# Patient Record
Sex: FEMALE | Race: WHITE | NOT HISPANIC OR LATINO | Employment: OTHER | URBAN - METROPOLITAN AREA
[De-identification: names, ages, dates, MRNs, and addresses within clinical notes are randomized per-mention and may not be internally consistent; named-entity substitution may affect disease eponyms.]

---

## 2017-01-03 ENCOUNTER — TELEPHONE (OUTPATIENT)
Dept: FAMILY MEDICINE CLINIC | Facility: CLINIC | Age: 42
End: 2017-01-03

## 2017-01-03 DIAGNOSIS — F98.8 ATTENTION DEFICIT DISORDER OF ADULT: ICD-10-CM

## 2017-01-03 NOTE — TELEPHONE ENCOUNTER
----- Message from Cathy Escobedo sent at 1/3/2017  8:11 AM EST -----  Contact: KATE  PATIENT REQUESTING A REFILL:    VYVANSE 40 MG    [phone]

## 2017-01-03 NOTE — TELEPHONE ENCOUNTER
----- Message from Cathy Escobedo sent at 1/3/2017  8:09 AM EST -----  Contact: LOVE  PATIENT REQUESTING A REFILL:    VYVANSE 40 MG.    [phone], MESSAGE OK

## 2017-02-03 ENCOUNTER — OFFICE VISIT (OUTPATIENT)
Dept: FAMILY MEDICINE CLINIC | Facility: CLINIC | Age: 42
End: 2017-02-03

## 2017-02-03 VITALS
HEART RATE: 77 BPM | HEIGHT: 70 IN | DIASTOLIC BLOOD PRESSURE: 72 MMHG | WEIGHT: 173 LBS | BODY MASS INDEX: 24.77 KG/M2 | SYSTOLIC BLOOD PRESSURE: 112 MMHG | OXYGEN SATURATION: 99 % | TEMPERATURE: 98.1 F | RESPIRATION RATE: 16 BRPM

## 2017-02-03 DIAGNOSIS — F98.8 ATTENTION DEFICIT DISORDER OF ADULT: Primary | ICD-10-CM

## 2017-02-03 PROBLEM — H91.93 BILATERAL HEARING LOSS: Status: ACTIVE | Noted: 2017-02-03

## 2017-02-03 PROCEDURE — 99213 OFFICE O/P EST LOW 20 MIN: CPT | Performed by: FAMILY MEDICINE

## 2017-02-03 NOTE — PROGRESS NOTES
Chief Complaint   Patient presents with   • ADD   • Med Refill       Subjective     HPI    ADD:    Pooja Whitehead is here for follow up for ADD.     Side effects: none    Medication: Vyvanse 40 mg daily  The patient reports adherence to this regimen    Appetite: unchanged    Sleep: sleeping well    Other Concerns: Some improvement but still decreased focus.   Has some h/o anxiety and has not noticed increased anxiety  trying to losing weight. Very low carb diet x 1 month.     On Wellbutrin as well       Past Medical History,Medications, Allergies, and social history was reviewed.    Review of Systems   Constitutional: Negative.    HENT: Positive for hearing loss (has hearing aids).    Respiratory: Negative.    Gastrointestinal: Negative.    Neurological: Negative.    Psychiatric/Behavioral: Negative.        Objective     Physical Exam   Constitutional: She is oriented to person, place, and time. She appears well-developed and well-nourished.   HENT:   Head: Normocephalic and atraumatic.   Right Ear: Tympanic membrane, external ear and ear canal normal. No decreased hearing is noted.   Left Ear: Tympanic membrane, external ear and ear canal normal. No decreased hearing is noted.   Eyes: Conjunctivae and EOM are normal. Pupils are equal, round, and reactive to light.   Cardiovascular: Normal rate, regular rhythm and normal heart sounds.    No murmur heard.  Pulmonary/Chest: Effort normal and breath sounds normal. No respiratory distress. She has no wheezes. She has no rales.   Neurological: She is alert and oriented to person, place, and time.   Skin: Skin is warm.   Psychiatric: She has a normal mood and affect. Her behavior is normal.   Nursing note and vitals reviewed.        Assessment/Plan     Problem List Items Addressed This Visit        Other    Attention deficit disorder of adult - [facility] dated on 2/3/2017   was reviewed and appropriate.      DISCUSSION  Increase to Vyvanse 50 mg one daily.    Follow-up office visit in 3 months.   Call if any side effects or concerns.           MEDICATIONS PRESCRIBED  Requested Prescriptions      No prescriptions requested or ordered in this encounter          Donald Will MD

## 2017-03-09 ENCOUNTER — TELEPHONE (OUTPATIENT)
Dept: FAMILY MEDICINE CLINIC | Facility: CLINIC | Age: 42
End: 2017-03-09

## 2017-03-09 DIAGNOSIS — F98.8 ATTENTION DEFICIT DISORDER OF ADULT: ICD-10-CM

## 2017-03-09 NOTE — TELEPHONE ENCOUNTER
----- Message from Cathy Escobedo sent at 3/9/2017  8:51 AM EST -----  Contact: LOVE  PATIENT REQUESTING A REFILL:      VYVANSE 50 MG      [phone], MESSAGE OK

## 2017-04-14 ENCOUNTER — TELEPHONE (OUTPATIENT)
Dept: FAMILY MEDICINE CLINIC | Facility: CLINIC | Age: 42
End: 2017-04-14

## 2017-04-14 DIAGNOSIS — F98.8 ATTENTION DEFICIT DISORDER OF ADULT: ICD-10-CM

## 2017-04-14 NOTE — TELEPHONE ENCOUNTER
----- Message from Cathy Escobedo sent at 4/14/2017  8:57 AM EDT -----  Contact: LOVE  PATIENT REQUESTING A REFILL:    VYVANSE 50 MG    [phone]

## 2017-05-05 ENCOUNTER — OFFICE VISIT (OUTPATIENT)
Dept: FAMILY MEDICINE CLINIC | Facility: CLINIC | Age: 42
End: 2017-05-05

## 2017-05-05 VITALS
WEIGHT: 170.6 LBS | TEMPERATURE: 97.6 F | HEIGHT: 70 IN | DIASTOLIC BLOOD PRESSURE: 70 MMHG | HEART RATE: 76 BPM | SYSTOLIC BLOOD PRESSURE: 110 MMHG | RESPIRATION RATE: 16 BRPM | BODY MASS INDEX: 24.42 KG/M2

## 2017-05-05 DIAGNOSIS — F41.8 MIXED ANXIETY DEPRESSIVE DISORDER: ICD-10-CM

## 2017-05-05 DIAGNOSIS — F98.8 ATTENTION DEFICIT DISORDER OF ADULT: ICD-10-CM

## 2017-05-05 DIAGNOSIS — F98.8 ATTENTION DEFICIT DISORDER OF ADULT: Primary | ICD-10-CM

## 2017-05-05 DIAGNOSIS — G25.81 RESTLESS LEG SYNDROME: ICD-10-CM

## 2017-05-05 DIAGNOSIS — G43.009 MIGRAINE WITHOUT AURA AND WITHOUT STATUS MIGRAINOSUS, NOT INTRACTABLE: ICD-10-CM

## 2017-05-05 PROCEDURE — 99214 OFFICE O/P EST MOD 30 MIN: CPT | Performed by: NURSE PRACTITIONER

## 2017-05-05 RX ORDER — ROPINIROLE 1 MG/1
1 TABLET, FILM COATED ORAL NIGHTLY PRN
COMMUNITY
End: 2017-12-27

## 2017-05-05 RX ORDER — BUPROPION HYDROCHLORIDE 300 MG/1
300 TABLET ORAL DAILY
Qty: 90 TABLET | Refills: 3 | Status: SHIPPED | OUTPATIENT
Start: 2017-05-05 | End: 2018-01-16 | Stop reason: SDUPTHER

## 2017-05-19 ENCOUNTER — TELEPHONE (OUTPATIENT)
Dept: FAMILY MEDICINE CLINIC | Facility: CLINIC | Age: 42
End: 2017-05-19

## 2017-05-19 RX ORDER — ESCITALOPRAM OXALATE 10 MG/1
10 TABLET ORAL DAILY
Qty: 30 TABLET | Refills: 3 | Status: SHIPPED | OUTPATIENT
Start: 2017-05-19 | End: 2017-06-08 | Stop reason: ALTCHOICE

## 2017-06-08 ENCOUNTER — TELEPHONE (OUTPATIENT)
Dept: FAMILY MEDICINE CLINIC | Facility: CLINIC | Age: 42
End: 2017-06-08

## 2017-06-08 RX ORDER — ESCITALOPRAM OXALATE 5 MG
5 TABLET ORAL DAILY
Qty: 30 TABLET | Refills: 5 | Status: SHIPPED | OUTPATIENT
Start: 2017-06-08 | End: 2017-08-04 | Stop reason: DRUGHIGH

## 2017-06-08 NOTE — TELEPHONE ENCOUNTER
Pt reports that she is having some urinary retention since starting Lexapro 5 mg. She never had problems with this medication in the past, but she reports that she was on branded drug before when she took it. She has had similar sx in the past with other medications (Duloxetine, Celexa, Strattera). She is doing much better with depression since starting this and was wondering if could change Lexapro to branded drug. Will send in script for brand only Lexapro and see if sx resolve. If not will need to discontinue med. She will make follow up appt if continues to have issues. ajc

## 2017-06-13 ENCOUNTER — TELEPHONE (OUTPATIENT)
Dept: FAMILY MEDICINE CLINIC | Facility: CLINIC | Age: 42
End: 2017-06-13

## 2017-06-13 DIAGNOSIS — F98.8 ATTENTION DEFICIT DISORDER OF ADULT: ICD-10-CM

## 2017-06-13 NOTE — TELEPHONE ENCOUNTER
----- Message from Maricruz Mayes sent at 6/13/2017  9:39 AM EDT -----  Contact: [facility]  Patient is needing a refill on Vyvanse 50mg. Please call patient when ready [phone], OK to leave a message.

## 2017-07-13 ENCOUNTER — TELEPHONE (OUTPATIENT)
Dept: FAMILY MEDICINE CLINIC | Facility: CLINIC | Age: 42
End: 2017-07-13

## 2017-07-13 DIAGNOSIS — F98.8 ATTENTION DEFICIT DISORDER OF ADULT: ICD-10-CM

## 2017-07-13 NOTE — TELEPHONE ENCOUNTER
----- Message from Idania Elizabeth sent at 7/13/2017 10:31 AM EDT -----  Contact: KATE/LISET CAIN  PATIENT IS NEEDING A REFILL ON VYVANCE 50 MG 1 X A DAY PATIENT WAS ADVISED OF THE  48 HOUR TURNAROUND TIME PLEASE CALL HER  [phone] OK T O LEAVE MSG

## 2017-08-04 ENCOUNTER — OFFICE VISIT (OUTPATIENT)
Dept: FAMILY MEDICINE CLINIC | Facility: CLINIC | Age: 42
End: 2017-08-04

## 2017-08-04 VITALS
HEIGHT: 70 IN | WEIGHT: 166 LBS | BODY MASS INDEX: 23.77 KG/M2 | RESPIRATION RATE: 16 BRPM | HEART RATE: 72 BPM | DIASTOLIC BLOOD PRESSURE: 75 MMHG | SYSTOLIC BLOOD PRESSURE: 130 MMHG

## 2017-08-04 DIAGNOSIS — L65.9 THINNING HAIR: ICD-10-CM

## 2017-08-04 DIAGNOSIS — F98.8 ATTENTION DEFICIT DISORDER OF ADULT: Primary | ICD-10-CM

## 2017-08-04 DIAGNOSIS — F41.8 MIXED ANXIETY DEPRESSIVE DISORDER: ICD-10-CM

## 2017-08-04 PROCEDURE — 99214 OFFICE O/P EST MOD 30 MIN: CPT | Performed by: NURSE PRACTITIONER

## 2017-08-04 RX ORDER — ESCITALOPRAM OXALATE 10 MG/1
10 TABLET, FILM COATED ORAL DAILY
Qty: 30 TABLET | Refills: 5 | Status: SHIPPED | OUTPATIENT
Start: 2017-08-04 | End: 2018-01-16 | Stop reason: SDUPTHER

## 2017-08-04 NOTE — PROGRESS NOTES
Subjective   Pooja Whitehead is a 41 y.o. female.     History of Present Illness   ADHD follow up, needs refill of Vyvanse  Doing well, no nervousness, difficulty sleeping, normal appetite    Anxiety/depression  Doing better on Lexapro with Bupropion but would like a little bump in dose thinks it will help  Paying cash for branded drug  No SI/HI, sleeping enough not too much   having struggles still with prosthetic leg, mood  Financially doing okay now that he has returned to work  She would like to have  testing to see what medication work best for her she has tried and failed numerous SSRI, SSNI medications for depression/anxiety and ADHD, very sensitive to pain medication.    New problem   Hair thinning for the past 3 months, used to have very thick hair. Has been taking Biotin and vitamins for skin, hair and nails but not really helping  Thinks it could be stress or hormone related  Had labs drawn and all results came back normal  She is perimenopausal, still has a regular menstrual cycle  Started using some topical OTC medication for hair loss Rogaine for the past several weeks    The following portions of the patient's history were reviewed and updated as appropriate: allergies, current medications, past family history, past medical history, past social history, past surgical history and problem list.    Review of Systems   Constitutional: Negative.  Negative for appetite change.   HENT: Negative.    Eyes: Negative.    Respiratory: Negative.  Negative for cough, choking and shortness of breath.    Cardiovascular: Negative.  Negative for chest pain, palpitations and leg swelling.   Gastrointestinal: Negative.    Endocrine: Negative.    Genitourinary: Negative.  Negative for difficulty urinating.   Musculoskeletal: Negative.    Skin: Negative.    Allergic/Immunologic: Negative.    Neurological: Negative.  Negative for dizziness and light-headedness.   Hematological: Negative.    Psychiatric/Behavioral:  Positive for dysphoric mood. Negative for self-injury, sleep disturbance and suicidal ideas. The patient is nervous/anxious.        Objective   Physical Exam   Constitutional: She is oriented to person, place, and time. She appears well-developed and well-nourished.   HENT:   Head: Normocephalic.   Right Ear: External ear normal.   Left Ear: External ear normal.   Nose: Nose normal.   Eyes: Lids are normal.   Neck: Trachea normal. Neck supple.   Cardiovascular: Normal rate, regular rhythm, S1 normal, S2 normal and normal heart sounds.    Pulmonary/Chest: Effort normal and breath sounds normal.   Musculoskeletal: Normal range of motion.   Neurological: She is alert and oriented to person, place, and time.   Skin: Skin is warm, dry and intact. No rash noted. No cyanosis. Nails show no clubbing.   Visible thinning of hair in front and top of scalp   Psychiatric: She has a normal mood and affect. Her speech is normal and behavior is normal. Judgment and thought content normal. Cognition and memory are normal.   Nursing note and vitals reviewed.      Assessment/Plan   Pooja was seen today for follow-up, adhd, anxiety and hairloss.    Diagnoses and all orders for this visit:    Attention deficit disorder of adult  -     lisdexamfetamine (VYVANSE) 50 MG capsule; Take 1 capsule by mouth Every Morning.    Thinning hair    Mixed anxiety depressive disorder    Other orders  -     LEXAPRO 10 MG tablet; Take 1 tablet by mouth Daily.    will increase Lexapro to 10 mg  Will send genomic testing and will notify pt of results  Will have Dr Fontana refill Vyvanse.   Labs reviewed for hairloss and thyroid, hormone levels all normal. Will scan results into chart. Continue medication OTC for hair regrowth  Pt to follow up in 3 months.

## 2017-09-06 ENCOUNTER — TRANSCRIBE ORDERS (OUTPATIENT)
Dept: ADMINISTRATIVE | Facility: HOSPITAL | Age: 42
End: 2017-09-06

## 2017-09-06 DIAGNOSIS — Z12.31 VISIT FOR SCREENING MAMMOGRAM: Primary | ICD-10-CM

## 2017-09-07 ENCOUNTER — TELEPHONE (OUTPATIENT)
Dept: FAMILY MEDICINE CLINIC | Facility: CLINIC | Age: 42
End: 2017-09-07

## 2017-09-07 DIAGNOSIS — F98.8 ATTENTION DEFICIT DISORDER OF ADULT: ICD-10-CM

## 2017-10-05 ENCOUNTER — TELEPHONE (OUTPATIENT)
Dept: FAMILY MEDICINE CLINIC | Facility: CLINIC | Age: 42
End: 2017-10-05

## 2017-10-05 DIAGNOSIS — F98.8 ATTENTION DEFICIT DISORDER OF ADULT: ICD-10-CM

## 2017-10-05 NOTE — TELEPHONE ENCOUNTER
----- Message from Cathy Escobedo sent at 10/5/2017  8:11 AM EDT -----  Contact: LOVE  PATIENT WILL BE GOING OUT OF TOWN ON SAT. MORNING AND COMES BACK ON THE OCT. 16TH. BUT, HER VYVANSE RX IS DUE ON THE 16TH. ASKING IF SHE CAN PICK ONE UP POST DATED FOR THE 16TH.     VYVANSE 50 MG    [phone], LEAVE MESSAGE

## 2017-10-05 NOTE — TELEPHONE ENCOUNTER
Left detailed message for pt to  Rx and bring photo ID. Provided office number for pt to cb with any questions.

## 2017-10-05 NOTE — TELEPHONE ENCOUNTER
Please call.  Okay to  the prescription.  Since the  for this will be less than 2 months from date written, the date on the prescription will not be an issue.  It will show the date as today but okay to  on the 16th.

## 2017-10-18 ENCOUNTER — HOSPITAL ENCOUNTER (OUTPATIENT)
Dept: MAMMOGRAPHY | Facility: HOSPITAL | Age: 42
Discharge: HOME | End: 2017-10-18
Attending: OBSTETRICS & GYNECOLOGY | Admitting: OBSTETRICS & GYNECOLOGY

## 2017-10-18 DIAGNOSIS — Z12.31 VISIT FOR SCREENING MAMMOGRAM: ICD-10-CM

## 2017-10-18 PROCEDURE — 77063 BREAST TOMOSYNTHESIS BI: CPT

## 2017-10-18 PROCEDURE — G0202 SCR MAMMO BI INCL CAD: HCPCS

## 2017-10-19 PROCEDURE — 77063 BREAST TOMOSYNTHESIS BI: CPT | Performed by: RADIOLOGY

## 2017-10-19 PROCEDURE — 77067 SCR MAMMO BI INCL CAD: CPT | Performed by: RADIOLOGY

## 2017-11-16 ENCOUNTER — TELEPHONE (OUTPATIENT)
Dept: FAMILY MEDICINE CLINIC | Facility: CLINIC | Age: 42
End: 2017-11-16

## 2017-11-16 DIAGNOSIS — F98.8 ATTENTION DEFICIT DISORDER OF ADULT: ICD-10-CM

## 2017-11-16 NOTE — TELEPHONE ENCOUNTER
----- Message from Nicole Babin sent at 11/16/2017 12:13 PM EST -----  Contact: DR LOVE MED REFILL  MED REFILL ON VYVANSE 50MG  [phone]

## 2017-12-19 ENCOUNTER — TELEPHONE (OUTPATIENT)
Dept: FAMILY MEDICINE CLINIC | Facility: CLINIC | Age: 42
End: 2017-12-19

## 2017-12-19 DIAGNOSIS — F98.8 ATTENTION DEFICIT DISORDER OF ADULT: ICD-10-CM

## 2017-12-19 DIAGNOSIS — G43.009 MIGRAINE WITHOUT AURA AND WITHOUT STATUS MIGRAINOSUS, NOT INTRACTABLE: ICD-10-CM

## 2017-12-19 RX ORDER — SUMATRIPTAN 100 MG/1
100 TABLET, FILM COATED ORAL
Qty: 9 TABLET | Refills: 5 | Status: SHIPPED | OUTPATIENT
Start: 2017-12-19 | End: 2022-08-19 | Stop reason: SDUPTHER

## 2017-12-19 NOTE — TELEPHONE ENCOUNTER
Ok to  Rx. Bds  Due for office visit for next refill and I sent in the imitrex to the pharmacy.

## 2017-12-19 NOTE — TELEPHONE ENCOUNTER
----- Message from Cathy Robins sent at 12/19/2017 11:02 AM EST -----  Contact: KATE;PT CALLED  REFILL ON VYANSE 50MG AND SUMAtriptan (IMITREX) 100 MG tablet    PHARMACY [facility] [place]    [phone]  MESSAGE OK

## 2017-12-27 RX ORDER — ASPIRIN 81 MG/1
81 TABLET ORAL DAILY
COMMUNITY
End: 2022-08-19

## 2017-12-28 ENCOUNTER — HOSPITAL ENCOUNTER (OUTPATIENT)
Facility: HOSPITAL | Age: 42
Setting detail: HOSPITAL OUTPATIENT SURGERY
Discharge: HOME | End: 2017-12-28
Attending: OBSTETRICS & GYNECOLOGY | Admitting: OBSTETRICS & GYNECOLOGY

## 2017-12-28 ENCOUNTER — ANESTHESIA EVENT (OUTPATIENT)
Dept: PERIOP | Facility: HOSPITAL | Age: 42
End: 2017-12-28

## 2017-12-28 ENCOUNTER — ANESTHESIA (OUTPATIENT)
Dept: PERIOP | Facility: HOSPITAL | Age: 42
End: 2017-12-28

## 2017-12-28 VITALS
SYSTOLIC BLOOD PRESSURE: 120 MMHG | OXYGEN SATURATION: 94 % | RESPIRATION RATE: 14 BRPM | TEMPERATURE: 98.1 F | BODY MASS INDEX: 24.05 KG/M2 | HEIGHT: 70 IN | HEART RATE: 69 BPM | WEIGHT: 168 LBS | DIASTOLIC BLOOD PRESSURE: 78 MMHG

## 2017-12-28 DIAGNOSIS — N92.0 MENORRHAGIA: ICD-10-CM

## 2017-12-28 LAB
B-HCG UR QL: NEGATIVE
INTERNAL NEGATIVE CONTROL: NORMAL
INTERNAL POSITIVE CONTROL: REACTIVE
Lab: NORMAL

## 2017-12-28 PROCEDURE — 25010000002 KETOROLAC TROMETHAMINE PER 15 MG: Performed by: NURSE ANESTHETIST, CERTIFIED REGISTERED

## 2017-12-28 PROCEDURE — 88305 TISSUE EXAM BY PATHOLOGIST: CPT | Performed by: OBSTETRICS & GYNECOLOGY

## 2017-12-28 PROCEDURE — 25010000002 FENTANYL CITRATE (PF) 100 MCG/2ML SOLUTION: Performed by: NURSE ANESTHETIST, CERTIFIED REGISTERED

## 2017-12-28 PROCEDURE — 25010000002 DEXAMETHASONE PER 1 MG: Performed by: NURSE ANESTHETIST, CERTIFIED REGISTERED

## 2017-12-28 PROCEDURE — 25010000002 FENTANYL CITRATE (PF) 100 MCG/2ML SOLUTION: Performed by: ANESTHESIOLOGY

## 2017-12-28 PROCEDURE — 25010000002 MIDAZOLAM PER 1 MG: Performed by: NURSE ANESTHETIST, CERTIFIED REGISTERED

## 2017-12-28 PROCEDURE — 25010000002 PROPOFOL 10 MG/ML EMULSION: Performed by: NURSE ANESTHETIST, CERTIFIED REGISTERED

## 2017-12-28 PROCEDURE — 25010000002 ONDANSETRON PER 1 MG: Performed by: NURSE ANESTHETIST, CERTIFIED REGISTERED

## 2017-12-28 RX ORDER — ACETAMINOPHEN 160 MG/5ML
650 SOLUTION ORAL EVERY 4 HOURS PRN
Status: CANCELLED | OUTPATIENT
Start: 2017-12-28

## 2017-12-28 RX ORDER — HYDROCODONE BITARTRATE AND ACETAMINOPHEN 5; 325 MG/1; MG/1
1 TABLET ORAL EVERY 6 HOURS PRN
Qty: 15 TABLET | Refills: 0 | Status: SHIPPED | OUTPATIENT
Start: 2017-12-28 | End: 2018-01-07

## 2017-12-28 RX ORDER — SODIUM CHLORIDE, SODIUM LACTATE, POTASSIUM CHLORIDE, CALCIUM CHLORIDE 600; 310; 30; 20 MG/100ML; MG/100ML; MG/100ML; MG/100ML
9 INJECTION, SOLUTION INTRAVENOUS CONTINUOUS
Status: DISCONTINUED | OUTPATIENT
Start: 2017-12-28 | End: 2017-12-28 | Stop reason: HOSPADM

## 2017-12-28 RX ORDER — HYDROMORPHONE HYDROCHLORIDE 1 MG/ML
0.5 INJECTION, SOLUTION INTRAMUSCULAR; INTRAVENOUS; SUBCUTANEOUS
Status: DISCONTINUED | OUTPATIENT
Start: 2017-12-28 | End: 2017-12-28 | Stop reason: HOSPADM

## 2017-12-28 RX ORDER — ACETAMINOPHEN 325 MG/1
650 TABLET ORAL EVERY 4 HOURS PRN
Status: CANCELLED | OUTPATIENT
Start: 2017-12-28

## 2017-12-28 RX ORDER — IBUPROFEN 800 MG/1
800 TABLET ORAL EVERY 6 HOURS PRN
Qty: 30 TABLET | Refills: 1 | Status: SHIPPED | OUTPATIENT
Start: 2017-12-28 | End: 2018-01-16

## 2017-12-28 RX ORDER — ONDANSETRON 2 MG/ML
4 INJECTION INTRAMUSCULAR; INTRAVENOUS EVERY 6 HOURS PRN
Status: CANCELLED | OUTPATIENT
Start: 2017-12-28

## 2017-12-28 RX ORDER — LIDOCAINE HYDROCHLORIDE 10 MG/ML
0.5 INJECTION, SOLUTION EPIDURAL; INFILTRATION; INTRACAUDAL; PERINEURAL ONCE AS NEEDED
Status: COMPLETED | OUTPATIENT
Start: 2017-12-28 | End: 2017-12-28

## 2017-12-28 RX ORDER — ONDANSETRON 4 MG/1
4 TABLET, FILM COATED ORAL EVERY 6 HOURS PRN
Status: CANCELLED | OUTPATIENT
Start: 2017-12-28

## 2017-12-28 RX ORDER — HYDROCODONE BITARTRATE AND ACETAMINOPHEN 5; 325 MG/1; MG/1
1 TABLET ORAL EVERY 4 HOURS PRN
Status: CANCELLED | OUTPATIENT
Start: 2017-12-28 | End: 2018-01-07

## 2017-12-28 RX ORDER — FENTANYL CITRATE 50 UG/ML
INJECTION, SOLUTION INTRAMUSCULAR; INTRAVENOUS AS NEEDED
Status: DISCONTINUED | OUTPATIENT
Start: 2017-12-28 | End: 2017-12-28 | Stop reason: SURG

## 2017-12-28 RX ORDER — ONDANSETRON 2 MG/ML
4 INJECTION INTRAMUSCULAR; INTRAVENOUS ONCE AS NEEDED
Status: DISCONTINUED | OUTPATIENT
Start: 2017-12-28 | End: 2017-12-28 | Stop reason: HOSPADM

## 2017-12-28 RX ORDER — HYDROCODONE BITARTRATE AND ACETAMINOPHEN 5; 325 MG/1; MG/1
1 TABLET ORAL ONCE AS NEEDED
Status: DISCONTINUED | OUTPATIENT
Start: 2017-12-28 | End: 2017-12-28 | Stop reason: HOSPADM

## 2017-12-28 RX ORDER — FAMOTIDINE 10 MG/ML
20 INJECTION, SOLUTION INTRAVENOUS ONCE
Status: DISCONTINUED | OUTPATIENT
Start: 2017-12-28 | End: 2017-12-28 | Stop reason: HOSPADM

## 2017-12-28 RX ORDER — PROMETHAZINE HYDROCHLORIDE 25 MG/1
25 SUPPOSITORY RECTAL ONCE AS NEEDED
Status: DISCONTINUED | OUTPATIENT
Start: 2017-12-28 | End: 2017-12-28 | Stop reason: HOSPADM

## 2017-12-28 RX ORDER — MIDAZOLAM HYDROCHLORIDE 1 MG/ML
INJECTION INTRAMUSCULAR; INTRAVENOUS AS NEEDED
Status: DISCONTINUED | OUTPATIENT
Start: 2017-12-28 | End: 2017-12-28 | Stop reason: SURG

## 2017-12-28 RX ORDER — KETOROLAC TROMETHAMINE 30 MG/ML
INJECTION, SOLUTION INTRAMUSCULAR; INTRAVENOUS AS NEEDED
Status: DISCONTINUED | OUTPATIENT
Start: 2017-12-28 | End: 2017-12-28 | Stop reason: SURG

## 2017-12-28 RX ORDER — FAMOTIDINE 20 MG/1
20 TABLET, FILM COATED ORAL ONCE
Status: COMPLETED | OUTPATIENT
Start: 2017-12-28 | End: 2017-12-28

## 2017-12-28 RX ORDER — DEXAMETHASONE SODIUM PHOSPHATE 4 MG/ML
INJECTION, SOLUTION INTRA-ARTICULAR; INTRALESIONAL; INTRAMUSCULAR; INTRAVENOUS; SOFT TISSUE AS NEEDED
Status: DISCONTINUED | OUTPATIENT
Start: 2017-12-28 | End: 2017-12-28 | Stop reason: SURG

## 2017-12-28 RX ORDER — PROMETHAZINE HYDROCHLORIDE 25 MG/ML
6.25 INJECTION, SOLUTION INTRAMUSCULAR; INTRAVENOUS ONCE AS NEEDED
Status: DISCONTINUED | OUTPATIENT
Start: 2017-12-28 | End: 2017-12-28 | Stop reason: HOSPADM

## 2017-12-28 RX ORDER — LIDOCAINE HYDROCHLORIDE 10 MG/ML
INJECTION, SOLUTION INFILTRATION; PERINEURAL AS NEEDED
Status: DISCONTINUED | OUTPATIENT
Start: 2017-12-28 | End: 2017-12-28 | Stop reason: SURG

## 2017-12-28 RX ORDER — FENTANYL CITRATE 50 UG/ML
50 INJECTION, SOLUTION INTRAMUSCULAR; INTRAVENOUS
Status: DISCONTINUED | OUTPATIENT
Start: 2017-12-28 | End: 2017-12-28 | Stop reason: HOSPADM

## 2017-12-28 RX ORDER — PROPOFOL 10 MG/ML
VIAL (ML) INTRAVENOUS AS NEEDED
Status: DISCONTINUED | OUTPATIENT
Start: 2017-12-28 | End: 2017-12-28 | Stop reason: SURG

## 2017-12-28 RX ORDER — IBUPROFEN 400 MG/1
400 TABLET ORAL EVERY 6 HOURS PRN
Status: CANCELLED | OUTPATIENT
Start: 2017-12-28

## 2017-12-28 RX ORDER — KETOROLAC TROMETHAMINE 30 MG/ML
30 INJECTION, SOLUTION INTRAMUSCULAR; INTRAVENOUS EVERY 6 HOURS PRN
Status: CANCELLED | OUTPATIENT
Start: 2017-12-28 | End: 2017-12-28

## 2017-12-28 RX ORDER — MAGNESIUM HYDROXIDE 1200 MG/15ML
LIQUID ORAL AS NEEDED
Status: DISCONTINUED | OUTPATIENT
Start: 2017-12-28 | End: 2017-12-28 | Stop reason: HOSPADM

## 2017-12-28 RX ORDER — PROMETHAZINE HYDROCHLORIDE 25 MG/1
25 TABLET ORAL ONCE AS NEEDED
Status: DISCONTINUED | OUTPATIENT
Start: 2017-12-28 | End: 2017-12-28 | Stop reason: HOSPADM

## 2017-12-28 RX ORDER — SODIUM CHLORIDE 0.9 % (FLUSH) 0.9 %
1-10 SYRINGE (ML) INJECTION AS NEEDED
Status: DISCONTINUED | OUTPATIENT
Start: 2017-12-28 | End: 2017-12-28 | Stop reason: HOSPADM

## 2017-12-28 RX ORDER — ONDANSETRON 2 MG/ML
INJECTION INTRAMUSCULAR; INTRAVENOUS AS NEEDED
Status: DISCONTINUED | OUTPATIENT
Start: 2017-12-28 | End: 2017-12-28 | Stop reason: SURG

## 2017-12-28 RX ADMIN — KETOROLAC TROMETHAMINE 30 MG: 30 INJECTION, SOLUTION INTRAMUSCULAR at 08:16

## 2017-12-28 RX ADMIN — LIDOCAINE HYDROCHLORIDE 0.5 ML: 10 INJECTION, SOLUTION EPIDURAL; INFILTRATION; INTRACAUDAL; PERINEURAL at 07:07

## 2017-12-28 RX ADMIN — ONDANSETRON 4 MG: 2 INJECTION INTRAMUSCULAR; INTRAVENOUS at 08:09

## 2017-12-28 RX ADMIN — PROPOFOL 200 MG: 10 INJECTION, EMULSION INTRAVENOUS at 07:59

## 2017-12-28 RX ADMIN — MIDAZOLAM HYDROCHLORIDE 2 MG: 1 INJECTION, SOLUTION INTRAMUSCULAR; INTRAVENOUS at 07:56

## 2017-12-28 RX ADMIN — FAMOTIDINE 20 MG: 20 TABLET ORAL at 07:07

## 2017-12-28 RX ADMIN — PROPOFOL 50 MG: 10 INJECTION, EMULSION INTRAVENOUS at 08:02

## 2017-12-28 RX ADMIN — FENTANYL CITRATE 50 MCG: 50 INJECTION, SOLUTION INTRAMUSCULAR; INTRAVENOUS at 08:02

## 2017-12-28 RX ADMIN — FENTANYL CITRATE 50 MCG: 50 INJECTION INTRAMUSCULAR; INTRAVENOUS at 08:50

## 2017-12-28 RX ADMIN — FENTANYL CITRATE 50 MCG: 50 INJECTION INTRAMUSCULAR; INTRAVENOUS at 08:45

## 2017-12-28 RX ADMIN — LIDOCAINE HYDROCHLORIDE 50 MG: 10 INJECTION, SOLUTION INFILTRATION; PERINEURAL at 07:59

## 2017-12-28 RX ADMIN — FENTANYL CITRATE 50 MCG: 50 INJECTION, SOLUTION INTRAMUSCULAR; INTRAVENOUS at 08:10

## 2017-12-28 RX ADMIN — DEXAMETHASONE SODIUM PHOSPHATE 8 MG: 4 INJECTION, SOLUTION INTRAMUSCULAR; INTRAVENOUS at 08:09

## 2017-12-28 RX ADMIN — SODIUM CHLORIDE, POTASSIUM CHLORIDE, SODIUM LACTATE AND CALCIUM CHLORIDE 9 ML/HR: 600; 310; 30; 20 INJECTION, SOLUTION INTRAVENOUS at 07:07

## 2017-12-28 NOTE — ANESTHESIA POSTPROCEDURE EVALUATION
Patient: Pooja Whitehead    Procedure Summary     Date Anesthesia Start Anesthesia Stop Room / Location    12/28/17 0756   [facility] / [facility]       Procedure Diagnosis Surgeon Provider    DILATATION AND CURETTAGE HYSTEROSCOPY NOVASURE ENDOMETRIAL ABLATION (N/A Vagina) No diagnosis on file. DO Kofi Hermosillo MD          Anesthesia Type: general  Last vitals  BP   130/76 (12/28/17 0703)   Temp   97.6 °F (36.4 °C) (12/28/17 0703)   Pulse   76 (12/28/17 0703)   Resp   18 (12/28/17 0703)     SpO2   96 % (12/28/17 0703)     Post Anesthesia Care and Evaluation    Patient location during evaluation: PACU  Patient participation: complete - patient participated  Post-procedure mental status: asleep.  Pain score: 0  Pain management: adequate  Airway patency: patent  Anesthetic complications: No anesthetic complications  PONV Status: none  Cardiovascular status: hemodynamically stable and acceptable  Respiratory status: nonlabored ventilation, acceptable and nasal cannula  Hydration status: acceptable

## 2017-12-28 NOTE — ANESTHESIA PREPROCEDURE EVALUATION
Anesthesia Evaluation     Patient summary reviewed and Nursing notes reviewed   NPO Solid Status: > 8 hours  NPO Liquid Status: > 8 hours     Airway   Mallampati: I  TM distance: <3 FB  Neck ROM: full  no difficulty expected  Dental - normal exam     Pulmonary - negative pulmonary ROS and normal exam   Cardiovascular - negative cardio ROS and normal exam        Neuro/Psych  (+) headaches, psychiatric history Anxiety and Depression,     GI/Hepatic/Renal/Endo - negative ROS     Musculoskeletal (-) negative ROS    Abdominal  - normal exam    Bowel sounds: normal.   Substance History - negative use     OB/GYN negative ob/gyn ROS         Other   (+) blood dyscrasia (FACTOR V LEIDEN)       Other Comment: HEARING LOSS                                      Anesthesia Plan    ASA 2     general     intravenous induction   Anesthetic plan and risks discussed with patient.    Plan discussed with CRNA.

## 2017-12-28 NOTE — ANESTHESIA PROCEDURE NOTES
Airway  Urgency: elective    Airway not difficult    General Information and Staff    Patient location during procedure: OR  CRNA: MAGALY GALINDO    Indications and Patient Condition  Indications for airway management: airway protection    Preoxygenated: yes  Mask difficulty assessment: 1 - vent by mask    Final Airway Details  Final airway type: supraglottic airway      Successful airway: classic  Size 4    Number of attempts at approach: 1    Additional Comments  LMA placed without difficulty, ventilation with assist, equal breath sounds and symmetric chest rise and fall

## 2018-01-03 LAB
CYTO UR: NORMAL
LAB AP CASE REPORT: NORMAL
LAB AP CLINICAL INFORMATION: NORMAL
LAB AP DIAGNOSIS COMMENT: NORMAL
Lab: NORMAL
PATH REPORT.FINAL DX SPEC: NORMAL
PATH REPORT.GROSS SPEC: NORMAL

## 2018-01-16 ENCOUNTER — OFFICE VISIT (OUTPATIENT)
Dept: FAMILY MEDICINE CLINIC | Facility: CLINIC | Age: 43
End: 2018-01-16

## 2018-01-16 VITALS
BODY MASS INDEX: 25.68 KG/M2 | HEIGHT: 70 IN | TEMPERATURE: 97.3 F | WEIGHT: 179.4 LBS | RESPIRATION RATE: 20 BRPM | HEART RATE: 72 BPM | SYSTOLIC BLOOD PRESSURE: 130 MMHG | DIASTOLIC BLOOD PRESSURE: 80 MMHG

## 2018-01-16 DIAGNOSIS — F98.8 ATTENTION DEFICIT DISORDER OF ADULT: Primary | ICD-10-CM

## 2018-01-16 DIAGNOSIS — F41.8 MIXED ANXIETY DEPRESSIVE DISORDER: ICD-10-CM

## 2018-01-16 PROCEDURE — 99213 OFFICE O/P EST LOW 20 MIN: CPT | Performed by: NURSE PRACTITIONER

## 2018-01-16 RX ORDER — ESCITALOPRAM OXALATE 10 MG/1
10 TABLET, FILM COATED ORAL DAILY
Qty: 30 TABLET | Refills: 5 | Status: SHIPPED | OUTPATIENT
Start: 2018-01-16 | End: 2018-04-12

## 2018-01-16 RX ORDER — BUPROPION HYDROCHLORIDE 300 MG/1
300 TABLET ORAL DAILY
Qty: 90 TABLET | Refills: 3 | Status: SHIPPED | OUTPATIENT
Start: 2018-01-16 | End: 2022-08-19

## 2018-01-16 NOTE — PROGRESS NOTES
Subjective   Pooja Whitehead is a 42 y.o. female.     History of Present Illness   ADD  Feeling like the medication is not working now, behind at work, not focusing as well  Taking Vyvanse 50 mg daily.   Would like increase in dose to 60 mg if possible.   Had been concerned about increasing the dose due to anxiety but it is better since increasing her dose of Lexapro 10 mg daily.   She has some issue with urinary retention if she does not take the branded drug of Lexapro.   She is working full time. Anxiety/Depression is stable. No suicidal thoughts.  Her  has recently lost his job as a flight paramedic. He has his disability hearing soon.    Her father has Parkinson's disease which is worsening and this is very difficult for her, he lives in a nursing home.    Uterine Ablation completed with Trevor Dec 21, 2017  No more discharge or bleeding. Doing well     The following portions of the patient's history were reviewed and updated as appropriate: allergies, current medications, past family history, past medical history, past social history, past surgical history and problem list.    Review of Systems   Constitutional: Positive for unexpected weight change. Negative for chills and fever.   Respiratory: Negative.    Cardiovascular: Negative.  Negative for chest pain and palpitations.   Genitourinary: Negative.  Negative for difficulty urinating, vaginal bleeding, vaginal discharge and vaginal pain.   Musculoskeletal: Negative.    Skin: Negative.    Neurological: Negative.  Negative for dizziness, light-headedness and headaches.   Hematological: Negative.    Psychiatric/Behavioral: Positive for decreased concentration and dysphoric mood. Negative for self-injury, sleep disturbance and suicidal ideas. The patient is nervous/anxious.        Objective   Physical Exam   Constitutional: She is oriented to person, place, and time. She appears well-developed and well-nourished. No distress.   HENT:   Head:  Normocephalic.   Neck: Neck supple.   Cardiovascular: Normal rate, regular rhythm and normal heart sounds.    Pulmonary/Chest: Effort normal.   Neurological: She is alert and oriented to person, place, and time.   Skin: Skin is warm and dry.   Psychiatric: She has a normal mood and affect. Her behavior is normal. Judgment and thought content normal.   Nursing note and vitals reviewed.      Assessment/Plan   Pooja was seen today for add.    Diagnoses and all orders for this visit:    Attention deficit disorder of adult  -     Discontinue: lisdexamfetamine (VYVANSE) 60 MG capsule; Take 1 capsule by mouth Every Morning  -     lisdexamfetamine (VYVANSE) 60 MG capsule; Take 1 capsule by mouth Every Morning    Other orders  -     buPROPion XL (WELLBUTRIN XL) 300 MG 24 hr tablet; Take 1 tablet by mouth Daily.  -     LEXAPRO 10 MG tablet; Take 1 tablet by mouth Daily.      Will refill Bupropion and Lexapro  Will have Dr Will increase dose of Vyvanse  F/U 3 months or sooner if any side effects or not tolerating new dose. Pt agrees.

## 2018-02-19 ENCOUNTER — TELEPHONE (OUTPATIENT)
Dept: FAMILY MEDICINE CLINIC | Facility: CLINIC | Age: 43
End: 2018-02-19

## 2018-02-19 DIAGNOSIS — F98.8 ATTENTION DEFICIT DISORDER OF ADULT: ICD-10-CM

## 2018-02-19 NOTE — TELEPHONE ENCOUNTER
----- Message from Sary Sesay sent at 2/19/2018 12:08 PM EST -----  Contact: [facility] MED REFILL REQUEST  Pt is requesting a refill on the following medication          Lisdexamfetamine Dimesylate (capsule) VYVANSE 60 MG Take 1 capsule by mouth Every Morning  Start Date: 1/16/2018   End Date: (no end date)  Dispense: 30 capsule          Call Back #   [phone]

## 2018-03-23 ENCOUNTER — TELEPHONE (OUTPATIENT)
Dept: FAMILY MEDICINE CLINIC | Facility: CLINIC | Age: 43
End: 2018-03-23

## 2018-03-23 DIAGNOSIS — F98.8 ATTENTION DEFICIT DISORDER OF ADULT: ICD-10-CM

## 2018-03-23 NOTE — TELEPHONE ENCOUNTER
----- Message from Amira Ji sent at 3/23/2018 11:56 AM EDT -----  Contact: LOVE  / PT CALL  PT CALL REQUESTING REFILL    lisdexamfetamine (VYVANSE) 60 MG capsule [[id number]]   Order Details   Dose: 60 mg Route: Oral Frequency: Every Morning  Dispense Quantity:  30 capsule Refills:  0 Fills remaining:  --         Sig: Take 1 capsule by mouth Every Morning    PT CALL BACK [phone]

## 2018-04-12 ENCOUNTER — OFFICE VISIT (OUTPATIENT)
Dept: FAMILY MEDICINE CLINIC | Facility: CLINIC | Age: 43
End: 2018-04-12

## 2018-04-12 VITALS
HEART RATE: 76 BPM | BODY MASS INDEX: 25.91 KG/M2 | DIASTOLIC BLOOD PRESSURE: 80 MMHG | WEIGHT: 181 LBS | RESPIRATION RATE: 16 BRPM | TEMPERATURE: 98.5 F | SYSTOLIC BLOOD PRESSURE: 120 MMHG | HEIGHT: 70 IN

## 2018-04-12 DIAGNOSIS — F98.8 ATTENTION DEFICIT DISORDER OF ADULT: Primary | ICD-10-CM

## 2018-04-12 PROCEDURE — 99213 OFFICE O/P EST LOW 20 MIN: CPT | Performed by: NURSE PRACTITIONER

## 2018-04-12 NOTE — PROGRESS NOTES
Subjective   Pooja Whitehead is a 42 y.o. female.     History of Present Illness   ADD  Here for refill of medication Vyvanse tolerating the increase in dose  No sleep disturbance anxiety or palpitations or weight loss  Helps with focus especially for staying up on work tasks and home obligations      The following portions of the patient's history were reviewed and updated as appropriate: allergies, current medications, past family history, past medical history, past social history, past surgical history and problem list.    Review of Systems   Constitutional: Negative.  Negative for activity change, appetite change, fatigue, unexpected weight gain and unexpected weight loss.   HENT: Negative.    Eyes: Negative.    Respiratory: Negative.    Cardiovascular: Negative.  Negative for chest pain and palpitations.   Gastrointestinal: Negative.    Endocrine: Negative.    Genitourinary: Negative.    Musculoskeletal: Negative.    Skin: Negative.    Allergic/Immunologic: Negative.    Neurological: Negative for dizziness, light-headedness and headaches.   Hematological: Negative.    Psychiatric/Behavioral: Negative for sleep disturbance, negative for hyperactivity and depressed mood. The patient is not nervous/anxious.        Objective   Physical Exam   Constitutional: She is oriented to person, place, and time. She appears well-developed and well-nourished.   HENT:   Head: Normocephalic.   Nose: Nose normal.   Neck: Neck supple. No JVD present. No thyromegaly present.   Cardiovascular: Normal rate, regular rhythm and normal heart sounds.    Pulmonary/Chest: Effort normal and breath sounds normal.   Musculoskeletal: Normal range of motion.   Lymphadenopathy:     She has no cervical adenopathy.   Neurological: She is alert and oriented to person, place, and time.   Skin: Skin is warm and dry.   Psychiatric: She has a normal mood and affect. Her behavior is normal. Judgment and thought content normal.   Nursing note and vitals  reviewed.        Assessment/Plan   Pooja was seen today for follow-up and add.    Diagnoses and all orders for this visit:    Attention deficit disorder of adult  -     lisdexamfetamine (VYVANSE) 60 MG capsule; Take 1 capsule by mouth Every Morning    will continue current dose of medication

## 2018-06-06 RX ORDER — ATOMOXETINE 10 MG/1
10 CAPSULE ORAL DAILY
Qty: 90 CAPSULE | Refills: 1 | Status: SHIPPED | OUTPATIENT
Start: 2018-06-06 | End: 2018-07-19

## 2018-06-29 ENCOUNTER — TELEPHONE (OUTPATIENT)
Dept: FAMILY MEDICINE CLINIC | Facility: CLINIC | Age: 43
End: 2018-06-29

## 2018-06-29 DIAGNOSIS — F98.8 ATTENTION DEFICIT DISORDER OF ADULT: ICD-10-CM

## 2018-06-29 NOTE — TELEPHONE ENCOUNTER
----- Message from Catyh Escobedo sent at 6/29/2018 11:34 AM EDT -----  Contact: KATE  PATIENT REQUESTING A REFILL:    PATIENT TRIED STRATTERA AND COULD NOT TAKE IT DUE TO SIDE EFFECTS    VYVANSE 50 MG    [facility] IN [place]    PLEASE CALL PATIENT WHEN CALLED IN

## 2018-06-29 NOTE — TELEPHONE ENCOUNTER
Please call.  I sent this to the pharmacy.    Please confirm the side effects that she had with Strattera and into that in to the allergy section.    Keep appointment as scheduled on 7/10/2018    Please run Juan.

## 2018-07-19 ENCOUNTER — OFFICE VISIT (OUTPATIENT)
Dept: FAMILY MEDICINE CLINIC | Facility: CLINIC | Age: 43
End: 2018-07-19

## 2018-07-19 VITALS
DIASTOLIC BLOOD PRESSURE: 74 MMHG | TEMPERATURE: 98.2 F | WEIGHT: 182 LBS | OXYGEN SATURATION: 99 % | BODY MASS INDEX: 26.05 KG/M2 | RESPIRATION RATE: 14 BRPM | HEART RATE: 76 BPM | SYSTOLIC BLOOD PRESSURE: 116 MMHG | HEIGHT: 70 IN

## 2018-07-19 DIAGNOSIS — F98.8 ATTENTION DEFICIT DISORDER OF ADULT: Primary | ICD-10-CM

## 2018-07-19 DIAGNOSIS — F41.8 MIXED ANXIETY DEPRESSIVE DISORDER: ICD-10-CM

## 2018-07-19 PROCEDURE — 99213 OFFICE O/P EST LOW 20 MIN: CPT | Performed by: NURSE PRACTITIONER

## 2018-07-19 RX ORDER — DEXTROAMPHETAMINE SACCHARATE, AMPHETAMINE ASPARTATE, DEXTROAMPHETAMINE SULFATE AND AMPHETAMINE SULFATE 7.5; 7.5; 7.5; 7.5 MG/1; MG/1; MG/1; MG/1
15 TABLET ORAL 2 TIMES DAILY
Qty: 30 TABLET | Refills: 0 | Status: SHIPPED | OUTPATIENT
Start: 2018-07-19 | End: 2018-08-23 | Stop reason: SDUPTHER

## 2018-07-19 NOTE — PROGRESS NOTES
Subjective   Pooja Whitehead is a 42 y.o. female.     History of Present Illness   ADD in adult  Switched From Concerta to Vyvanse 60 mg daily but then it did not seem to be working so changed to Strattera but she did not tolerate due to urinary retention (worked well for focus but could not void)  Would like to Adderall she has never tried taking this before for ADD   Has gotten very far behind on charts without medication (problem she had in the past before medication)  Anxiety stable  Depression stable  Some fatigue but busy with life and family and work  Father is in nursing home and has Parkinson's      The following portions of the patient's history were reviewed and updated as appropriate: allergies, current medications, past family history, past medical history, past social history, past surgical history and problem list.    Review of Systems   Constitutional: Negative.  Negative for unexpected weight gain and unexpected weight loss.   HENT: Negative.    Eyes: Negative.    Respiratory: Negative.    Cardiovascular: Negative.    Gastrointestinal: Negative.    Endocrine: Negative.    Genitourinary: Negative.    Musculoskeletal: Negative.    Skin: Negative.    Allergic/Immunologic: Negative.    Neurological: Negative.    Hematological: Negative.    Psychiatric/Behavioral: Negative.  Negative for sleep disturbance.   All other systems reviewed and are negative.      Objective   Physical Exam   Constitutional: She is oriented to person, place, and time. She appears well-developed and well-nourished.   HENT:   Head: Normocephalic.   Nose: Nose normal.   Cardiovascular: Normal rate, regular rhythm and normal heart sounds.    Pulmonary/Chest: Effort normal and breath sounds normal.   Neurological: She is alert and oriented to person, place, and time.   Skin: Skin is warm and dry.   Psychiatric: She has a normal mood and affect. Her behavior is normal. Judgment and thought content normal.   Nursing note and vitals  reviewed.        Assessment/Plan   Pooja was seen today for add.    Diagnoses and all orders for this visit:    Attention deficit disorder of adult  -     amphetamine-dextroamphetamine (ADDERALL) 30 MG tablet; Take 0.5 tablets by mouth 2 (Two) Times a Day.    Mixed anxiety depressive disorder    will change to Adderall to see if this will help with focus.  F/U one month if not improving

## 2018-08-23 ENCOUNTER — TELEPHONE (OUTPATIENT)
Dept: FAMILY MEDICINE CLINIC | Facility: CLINIC | Age: 43
End: 2018-08-23

## 2018-08-23 DIAGNOSIS — F98.8 ATTENTION DEFICIT DISORDER OF ADULT: ICD-10-CM

## 2018-08-23 RX ORDER — DEXTROAMPHETAMINE SACCHARATE, AMPHETAMINE ASPARTATE, DEXTROAMPHETAMINE SULFATE AND AMPHETAMINE SULFATE 7.5; 7.5; 7.5; 7.5 MG/1; MG/1; MG/1; MG/1
15 TABLET ORAL 2 TIMES DAILY
Qty: 30 TABLET | Refills: 0 | Status: SHIPPED | OUTPATIENT
Start: 2018-08-23 | End: 2018-10-03 | Stop reason: SDUPTHER

## 2018-08-23 NOTE — TELEPHONE ENCOUNTER
----- Message from Sary Sesay sent at 8/23/2018 12:16 PM EDT -----  Contact: VINAY ; MED REFILL   MED REFILL     amphetamine-dextroamphetamine (ADDERALL) 30 MG tablet Take 0.5 tablets by mouth 2 (Two) Times a Day.     PHARMACY   [facility]       CALL BACK   [phone]

## 2018-10-03 ENCOUNTER — TELEPHONE (OUTPATIENT)
Dept: FAMILY MEDICINE CLINIC | Facility: CLINIC | Age: 43
End: 2018-10-03

## 2018-10-03 DIAGNOSIS — F98.8 ATTENTION DEFICIT DISORDER OF ADULT: ICD-10-CM

## 2018-10-03 RX ORDER — DEXTROAMPHETAMINE SACCHARATE, AMPHETAMINE ASPARTATE, DEXTROAMPHETAMINE SULFATE AND AMPHETAMINE SULFATE 7.5; 7.5; 7.5; 7.5 MG/1; MG/1; MG/1; MG/1
15 TABLET ORAL 2 TIMES DAILY
Qty: 30 TABLET | Refills: 0 | Status: SHIPPED | OUTPATIENT
Start: 2018-10-03 | End: 2018-11-28 | Stop reason: SDUPTHER

## 2018-10-03 NOTE — TELEPHONE ENCOUNTER
----- Message from Gris Uriarte MA sent at 10/3/2018  8:26 AM EDT -----  Contact: Madrigal, patient  Patient called requesting a refill on her Adderall sent to [facility] [place]    [phone] patient call back

## 2018-11-08 ENCOUNTER — TRANSCRIBE ORDERS (OUTPATIENT)
Dept: ADMINISTRATIVE | Facility: HOSPITAL | Age: 43
End: 2018-11-08

## 2018-11-08 DIAGNOSIS — Z12.31 VISIT FOR SCREENING MAMMOGRAM: Primary | ICD-10-CM

## 2018-11-28 ENCOUNTER — TELEPHONE (OUTPATIENT)
Dept: FAMILY MEDICINE CLINIC | Facility: CLINIC | Age: 43
End: 2018-11-28

## 2018-11-28 DIAGNOSIS — F98.8 ATTENTION DEFICIT DISORDER OF ADULT: ICD-10-CM

## 2018-11-28 RX ORDER — DEXTROAMPHETAMINE SACCHARATE, AMPHETAMINE ASPARTATE, DEXTROAMPHETAMINE SULFATE AND AMPHETAMINE SULFATE 7.5; 7.5; 7.5; 7.5 MG/1; MG/1; MG/1; MG/1
15 TABLET ORAL 2 TIMES DAILY
Qty: 30 TABLET | Refills: 0 | Status: SHIPPED | OUTPATIENT
Start: 2018-11-28 | End: 2022-08-19

## 2018-11-28 NOTE — TELEPHONE ENCOUNTER
----- Message from Nicole Babin sent at 11/28/2018  8:21 AM EST -----  Contact: [facility] REFILL  MED REFILL ON ADDERALL 30MG TO [facility] IN [place].  [phone]

## 2018-12-21 ENCOUNTER — APPOINTMENT (OUTPATIENT)
Dept: MAMMOGRAPHY | Facility: HOSPITAL | Age: 43
End: 2018-12-21
Attending: OBSTETRICS & GYNECOLOGY

## 2019-01-28 ENCOUNTER — HOSPITAL ENCOUNTER (OUTPATIENT)
Dept: MAMMOGRAPHY | Facility: HOSPITAL | Age: 44
Discharge: HOME | End: 2019-01-28
Attending: OBSTETRICS & GYNECOLOGY | Admitting: OBSTETRICS & GYNECOLOGY

## 2019-01-28 DIAGNOSIS — Z12.31 VISIT FOR SCREENING MAMMOGRAM: ICD-10-CM

## 2019-01-28 PROCEDURE — 77063 BREAST TOMOSYNTHESIS BI: CPT | Performed by: RADIOLOGY

## 2019-01-28 PROCEDURE — 77067 SCR MAMMO BI INCL CAD: CPT | Performed by: RADIOLOGY

## 2019-01-28 PROCEDURE — 77063 BREAST TOMOSYNTHESIS BI: CPT

## 2019-01-28 PROCEDURE — 77067 SCR MAMMO BI INCL CAD: CPT

## 2019-02-05 ENCOUNTER — HOSPITAL ENCOUNTER (OUTPATIENT)
Dept: ULTRASOUND IMAGING | Facility: HOSPITAL | Age: 44
Discharge: HOME | End: 2019-02-05

## 2019-02-05 ENCOUNTER — HOSPITAL ENCOUNTER (OUTPATIENT)
Dept: MAMMOGRAPHY | Facility: HOSPITAL | Age: 44
Discharge: HOME | End: 2019-02-05
Admitting: RADIOLOGY

## 2019-02-05 DIAGNOSIS — R92.8 ABNORMAL MAMMOGRAM: ICD-10-CM

## 2019-02-05 PROCEDURE — 76642 ULTRASOUND BREAST LIMITED: CPT

## 2019-02-05 PROCEDURE — 77065 DX MAMMO INCL CAD UNI: CPT | Performed by: RADIOLOGY

## 2019-02-05 PROCEDURE — G0279 TOMOSYNTHESIS, MAMMO: HCPCS

## 2019-02-05 PROCEDURE — 76642 ULTRASOUND BREAST LIMITED: CPT | Performed by: RADIOLOGY

## 2019-02-05 PROCEDURE — 77065 DX MAMMO INCL CAD UNI: CPT

## 2019-02-05 PROCEDURE — 77061 BREAST TOMOSYNTHESIS UNI: CPT | Performed by: RADIOLOGY

## 2020-02-28 ENCOUNTER — TRANSCRIBE ORDERS (OUTPATIENT)
Dept: ADMINISTRATIVE | Facility: HOSPITAL | Age: 45
End: 2020-02-28

## 2020-02-28 DIAGNOSIS — Z12.31 VISIT FOR SCREENING MAMMOGRAM: Primary | ICD-10-CM

## 2020-05-04 ENCOUNTER — HOSPITAL ENCOUNTER (OUTPATIENT)
Dept: MAMMOGRAPHY | Facility: HOSPITAL | Age: 45
Discharge: HOME | End: 2020-05-04
Admitting: OBSTETRICS & GYNECOLOGY

## 2020-05-04 DIAGNOSIS — Z12.31 VISIT FOR SCREENING MAMMOGRAM: ICD-10-CM

## 2020-05-04 PROCEDURE — 77067 SCR MAMMO BI INCL CAD: CPT | Performed by: RADIOLOGY

## 2020-05-04 PROCEDURE — 77067 SCR MAMMO BI INCL CAD: CPT

## 2020-05-04 PROCEDURE — 77063 BREAST TOMOSYNTHESIS BI: CPT | Performed by: RADIOLOGY

## 2020-05-04 PROCEDURE — 77063 BREAST TOMOSYNTHESIS BI: CPT

## 2020-05-08 ENCOUNTER — HOSPITAL ENCOUNTER (OUTPATIENT)
Dept: MAMMOGRAPHY | Facility: HOSPITAL | Age: 45
Discharge: HOME | End: 2020-05-08
Admitting: RADIOLOGY

## 2020-05-08 ENCOUNTER — HOSPITAL ENCOUNTER (OUTPATIENT)
Dept: ULTRASOUND IMAGING | Facility: HOSPITAL | Age: 45
Discharge: HOME | End: 2020-05-08

## 2020-05-08 DIAGNOSIS — R92.8 ABNORMAL MAMMOGRAM: ICD-10-CM

## 2020-05-08 PROCEDURE — 77065 DX MAMMO INCL CAD UNI: CPT

## 2020-05-08 PROCEDURE — 77065 DX MAMMO INCL CAD UNI: CPT | Performed by: RADIOLOGY

## 2020-05-08 PROCEDURE — 76642 ULTRASOUND BREAST LIMITED: CPT

## 2020-05-08 PROCEDURE — G0279 TOMOSYNTHESIS, MAMMO: HCPCS

## 2020-05-08 PROCEDURE — 76642 ULTRASOUND BREAST LIMITED: CPT | Performed by: RADIOLOGY

## 2020-05-08 PROCEDURE — 77061 BREAST TOMOSYNTHESIS UNI: CPT | Performed by: RADIOLOGY

## 2020-09-14 ENCOUNTER — LAB (OUTPATIENT)
Dept: LAB | Facility: HOSPITAL | Age: 45
End: 2020-09-14

## 2020-09-14 ENCOUNTER — TRANSCRIBE ORDERS (OUTPATIENT)
Dept: LAB | Facility: HOSPITAL | Age: 45
End: 2020-09-14

## 2020-09-14 DIAGNOSIS — N95.1 SYMPTOMATIC MENOPAUSAL OR FEMALE CLIMACTERIC STATES: Primary | ICD-10-CM

## 2020-09-14 DIAGNOSIS — E04.2 NONTOXIC MULTINODULAR GOITER: ICD-10-CM

## 2020-09-14 DIAGNOSIS — N95.1 SYMPTOMATIC MENOPAUSAL OR FEMALE CLIMACTERIC STATES: ICD-10-CM

## 2020-09-14 LAB
ESTRADIOL SERPL HS-MCNC: 39.4 PG/ML
FSH SERPL-ACNC: 50.6 MIU/ML
TESTOST SERPL-MCNC: 10.7 NG/DL (ref 8.4–48.1)
TSH SERPL DL<=0.05 MIU/L-ACNC: 0.59 UIU/ML (ref 0.27–4.2)

## 2020-09-14 PROCEDURE — 82670 ASSAY OF TOTAL ESTRADIOL: CPT

## 2020-09-14 PROCEDURE — 84443 ASSAY THYROID STIM HORMONE: CPT

## 2020-09-14 PROCEDURE — 84479 ASSAY OF THYROID (T3 OR T4): CPT

## 2020-09-14 PROCEDURE — 84402 ASSAY OF FREE TESTOSTERONE: CPT

## 2020-09-14 PROCEDURE — 36415 COLL VENOUS BLD VENIPUNCTURE: CPT

## 2020-09-14 PROCEDURE — 84403 ASSAY OF TOTAL TESTOSTERONE: CPT

## 2020-09-14 PROCEDURE — 83001 ASSAY OF GONADOTROPIN (FSH): CPT

## 2020-09-16 LAB — T3RU NFR SERPL: 24 % (ref 24–39)

## 2020-09-19 LAB — TESTOST FREE SERPL-MCNC: 0.7 PG/ML (ref 0–4.2)

## 2021-02-02 RX ORDER — BIMATOPROST 3 UG/ML
SOLUTION TOPICAL
Qty: 5 ML | Refills: 12 | Status: SHIPPED | OUTPATIENT
Start: 2021-02-02 | End: 2022-08-19

## 2021-07-14 ENCOUNTER — TRANSCRIBE ORDERS (OUTPATIENT)
Dept: ADMINISTRATIVE | Facility: HOSPITAL | Age: 46
End: 2021-07-14

## 2021-07-14 DIAGNOSIS — Z12.31 ENCOUNTER FOR SCREENING MAMMOGRAM FOR MALIGNANT NEOPLASM OF BREAST: Primary | ICD-10-CM

## 2021-08-19 ENCOUNTER — HOSPITAL ENCOUNTER (OUTPATIENT)
Dept: MAMMOGRAPHY | Facility: HOSPITAL | Age: 46
Discharge: HOME | End: 2021-08-19
Admitting: NURSE PRACTITIONER

## 2021-08-19 DIAGNOSIS — Z12.31 ENCOUNTER FOR SCREENING MAMMOGRAM FOR MALIGNANT NEOPLASM OF BREAST: ICD-10-CM

## 2021-08-19 PROCEDURE — 77067 SCR MAMMO BI INCL CAD: CPT

## 2021-08-19 PROCEDURE — 77063 BREAST TOMOSYNTHESIS BI: CPT | Performed by: RADIOLOGY

## 2021-08-19 PROCEDURE — 77067 SCR MAMMO BI INCL CAD: CPT | Performed by: RADIOLOGY

## 2021-08-19 PROCEDURE — 77063 BREAST TOMOSYNTHESIS BI: CPT

## 2022-03-22 ENCOUNTER — TRANSCRIBE ORDERS (OUTPATIENT)
Dept: ADMINISTRATIVE | Facility: HOSPITAL | Age: 47
End: 2022-03-22

## 2022-03-22 DIAGNOSIS — K43.2 VENTRAL INCISIONAL HERNIA WITHOUT OBSTRUCTION OR GANGRENE: Primary | ICD-10-CM

## 2022-03-31 ENCOUNTER — APPOINTMENT (OUTPATIENT)
Dept: CT IMAGING | Facility: HOSPITAL | Age: 47
End: 2022-03-31

## 2022-04-07 ENCOUNTER — APPOINTMENT (OUTPATIENT)
Dept: CT IMAGING | Facility: HOSPITAL | Age: 47
End: 2022-04-07

## 2022-06-27 ENCOUNTER — TELEPHONE (OUTPATIENT)
Dept: FAMILY MEDICINE CLINIC | Facility: CLINIC | Age: 47
End: 2022-06-27

## 2022-06-27 NOTE — TELEPHONE ENCOUNTER
Caller: Pooja Whitehead    Relationship: Self    Best call back number: [phone]    What form or medical record are you requesting: GENETIC TESTING LAB RESULTS    Who is requesting this form or medical record from you: PATIENT    Additional notes: PATIENT IS LOOKING FOR A COPY OF HER GENETIC TESTING SHE DID ABOUT WHAT MEDICATIONS WORK BEST FOR HER AND CAN NOT FIND IT IN Epic. NOT SURE HOW FAR LONG AGO IT WAS MAYBE 2016? PLEASE ADVISE AND CALL PATIENT BACK. CAN LEAVE VOICEMAIL.

## 2022-08-19 ENCOUNTER — OFFICE VISIT (OUTPATIENT)
Dept: FAMILY MEDICINE CLINIC | Facility: CLINIC | Age: 47
End: 2022-08-19

## 2022-08-19 VITALS
RESPIRATION RATE: 18 BRPM | BODY MASS INDEX: 29.18 KG/M2 | WEIGHT: 203.8 LBS | OXYGEN SATURATION: 96 % | DIASTOLIC BLOOD PRESSURE: 86 MMHG | SYSTOLIC BLOOD PRESSURE: 128 MMHG | HEIGHT: 70 IN | HEART RATE: 90 BPM | TEMPERATURE: 98 F

## 2022-08-19 DIAGNOSIS — F41.8 MIXED ANXIETY DEPRESSIVE DISORDER: Primary | ICD-10-CM

## 2022-08-19 DIAGNOSIS — E04.2 NON-TOXIC MULTINODULAR GOITER: ICD-10-CM

## 2022-08-19 DIAGNOSIS — Z83.42 FAMILY HISTORY OF HYPERCHOLESTEROLEMIA: ICD-10-CM

## 2022-08-19 DIAGNOSIS — Z11.59 ENCOUNTER FOR HEPATITIS C SCREENING TEST FOR LOW RISK PATIENT: ICD-10-CM

## 2022-08-19 DIAGNOSIS — G43.009 MIGRAINE WITHOUT AURA AND WITHOUT STATUS MIGRAINOSUS, NOT INTRACTABLE: ICD-10-CM

## 2022-08-19 DIAGNOSIS — Z12.11 SCREEN FOR COLON CANCER: ICD-10-CM

## 2022-08-19 DIAGNOSIS — F98.8 ATTENTION DEFICIT DISORDER OF ADULT: ICD-10-CM

## 2022-08-19 PROCEDURE — 99204 OFFICE O/P NEW MOD 45 MIN: CPT | Performed by: FAMILY MEDICINE

## 2022-08-19 RX ORDER — SUMATRIPTAN 100 MG/1
100 TABLET, FILM COATED ORAL
Qty: 9 TABLET | Refills: 5 | Status: SHIPPED | OUTPATIENT
Start: 2022-08-19

## 2022-08-19 NOTE — PROGRESS NOTES
Subjective   Pooja Whitehead is a 46 y.o. female.     History of Present Illness     She notes that she has anxiety, depression and ADHD  strattera worked well but she had urinary retention  She just feels nervous all the time and is over thinking things  This is major issue although focus is secondary issue  Has not been on adhd medicine for quite a while  Lexapro name brand helped but she had weight gain with this  Generic lexapro failed  prozac failed      imitrerx works well for migraines  Rare usage of this medicine but she wojld like it available  Asking for refills today      The following portions of the patient's history were reviewed and updated as appropriate: allergies, current medications, past family history, past medical history, past social history, past surgical history and problem list.    Review of Systems   Constitutional: Negative.    Psychiatric/Behavioral: Positive for decreased concentration. The patient is nervous/anxious.        Objective   Physical Exam  Vitals and nursing note reviewed.   Constitutional:       General: She is not in acute distress.     Appearance: Normal appearance. She is well-developed.   Cardiovascular:      Rate and Rhythm: Normal rate and regular rhythm.      Heart sounds: Normal heart sounds.   Pulmonary:      Effort: Pulmonary effort is normal.      Breath sounds: Normal breath sounds.   Neurological:      Mental Status: She is alert and oriented to person, place, and time.   Psychiatric:         Mood and Affect: Mood normal.         Behavior: Behavior normal.         Thought Content: Thought content normal.         Judgment: Judgment normal.         Assessment & Plan   Diagnoses and all orders for this visit:    1. Mixed anxiety depressive disorder (Primary)  -     CBC & Differential  -     Comprehensive Metabolic Panel  -     TSH+Free T4    2. Attention deficit disorder of adult    3. Family history of hypercholesterolemia  -     CBC & Differential  -      Comprehensive Metabolic Panel  -     Lipid Panel    4. Encounter for hepatitis C screening test for low risk patient  -     Hepatitis C Antibody    5. Screen for colon cancer  -     Cologuard - Stool, Per Rectum; Future    6. Non-toxic multinodular goiter  Assessment & Plan:  Was told US no longer needed by endocrinologist    Orders:  -     TSH+Free T4    7. Migraine without aura and without status migrainosus, not intractable  -     SUMAtriptan (IMITREX) 100 MG tablet; Take 1 tablet by mouth Every 2 (Two) Hours As Needed for Migraine.  Dispense: 9 tablet; Refill: 5    Discussed pros/cons/SE of medicine and she has had issues with multiple meds.  Will try trintellix.  Start 5 mg 2 weeks then increase to 10 mg.  Plan rehcek in one month.  Discussed this is great for anxiety and may help focus  Consider qelbree for ADHD pending results of trintellix.  Discuss w/ pt in one month  Ok PRN imitrex for migraines, works well when needed  Will check labs for lipids and thyroid  and f/u pending results    cologuard ordered as well

## 2022-09-14 ENCOUNTER — TELEPHONE (OUTPATIENT)
Dept: FAMILY MEDICINE CLINIC | Facility: CLINIC | Age: 47
End: 2022-09-14

## 2022-09-14 RX ORDER — DESVENLAFAXINE SUCCINATE 50 MG/1
50 TABLET, EXTENDED RELEASE ORAL DAILY
Qty: 30 TABLET | Refills: 2 | Status: SHIPPED | OUTPATIENT
Start: 2022-09-14 | End: 2022-10-21

## 2022-09-14 NOTE — TELEPHONE ENCOUNTER
Caller: Pooja Whitehead    Relationship to patient: Self    Best call back number: [phone]    Patient is needing: PATIENT STATES THE TRINTELLIX HAS CAUSED URINARY RETENTION IN WHICH SHE STATES SHE AND DR DOMINGUEZ DISCUSSED. PATIENT IS WANTING TO TRY A NEW MEDICATION. IF DR DOMINGUEZ AGREES TO CHANGE, PLEASE SEND A NEW MEDICATION IN REPLACEMENT OF THIS TO [id number] [address] AT [facility] & [address] [facility] [phone] [facility] [phone] FX  [phone].    IF A NEW MED IS CALLED IN, SHOULD PATIENT MOVE HER FOLLOW UP? PLEASE CALL.

## 2022-10-01 LAB
ALBUMIN SERPL-MCNC: 4.7 G/DL (ref 3.8–4.8)
ALBUMIN/GLOB SERPL: 1.6 {RATIO} (ref 1.2–2.2)
ALP SERPL-CCNC: 78 IU/L (ref 44–121)
ALT SERPL-CCNC: 19 IU/L (ref 0–32)
AST SERPL-CCNC: 17 IU/L (ref 0–40)
BASOPHILS # BLD AUTO: 0 X10E3/UL (ref 0–0.2)
BASOPHILS NFR BLD AUTO: 1 %
BILIRUB SERPL-MCNC: 0.5 MG/DL (ref 0–1.2)
BUN SERPL-MCNC: 10 MG/DL (ref 6–24)
BUN/CREAT SERPL: 13 (ref 9–23)
CALCIUM SERPL-MCNC: 9.4 MG/DL (ref 8.7–10.2)
CHLORIDE SERPL-SCNC: 100 MMOL/L (ref 96–106)
CHOLEST SERPL-MCNC: 233 MG/DL (ref 100–199)
CO2 SERPL-SCNC: 22 MMOL/L (ref 20–29)
CREAT SERPL-MCNC: 0.76 MG/DL (ref 0.57–1)
EGFRCR SERPLBLD CKD-EPI 2021: 98 ML/MIN/1.73
EOSINOPHIL # BLD AUTO: 0.1 X10E3/UL (ref 0–0.4)
EOSINOPHIL NFR BLD AUTO: 1 %
ERYTHROCYTE [DISTWIDTH] IN BLOOD BY AUTOMATED COUNT: 13.2 % (ref 11.7–15.4)
GLOBULIN SER CALC-MCNC: 2.9 G/DL (ref 1.5–4.5)
GLUCOSE SERPL-MCNC: 80 MG/DL (ref 70–99)
HCT VFR BLD AUTO: 43.9 % (ref 34–46.6)
HCV AB S/CO SERPL IA: <0.1 S/CO RATIO (ref 0–0.9)
HDLC SERPL-MCNC: 49 MG/DL
HGB BLD-MCNC: 14.5 G/DL (ref 11.1–15.9)
IMM GRANULOCYTES # BLD AUTO: 0 X10E3/UL (ref 0–0.1)
IMM GRANULOCYTES NFR BLD AUTO: 0 %
LDLC SERPL CALC-MCNC: 168 MG/DL (ref 0–99)
LYMPHOCYTES # BLD AUTO: 1.8 X10E3/UL (ref 0.7–3.1)
LYMPHOCYTES NFR BLD AUTO: 23 %
MCH RBC QN AUTO: 27.6 PG (ref 26.6–33)
MCHC RBC AUTO-ENTMCNC: 33 G/DL (ref 31.5–35.7)
MCV RBC AUTO: 84 FL (ref 79–97)
MONOCYTES # BLD AUTO: 0.7 X10E3/UL (ref 0.1–0.9)
MONOCYTES NFR BLD AUTO: 8 %
NEUTROPHILS # BLD AUTO: 5.2 X10E3/UL (ref 1.4–7)
NEUTROPHILS NFR BLD AUTO: 67 %
PLATELET # BLD AUTO: 242 X10E3/UL (ref 150–450)
POTASSIUM SERPL-SCNC: 4.4 MMOL/L (ref 3.5–5.2)
PROT SERPL-MCNC: 7.6 G/DL (ref 6–8.5)
RBC # BLD AUTO: 5.25 X10E6/UL (ref 3.77–5.28)
SODIUM SERPL-SCNC: 139 MMOL/L (ref 134–144)
T4 FREE SERPL-MCNC: 1.08 NG/DL (ref 0.82–1.77)
TRIGL SERPL-MCNC: 92 MG/DL (ref 0–149)
TSH SERPL DL<=0.005 MIU/L-ACNC: 0.47 UIU/ML (ref 0.45–4.5)
VLDLC SERPL CALC-MCNC: 16 MG/DL (ref 5–40)
WBC # BLD AUTO: 7.8 X10E3/UL (ref 3.4–10.8)

## 2022-10-06 ENCOUNTER — TRANSCRIBE ORDERS (OUTPATIENT)
Dept: ADMINISTRATIVE | Facility: HOSPITAL | Age: 47
End: 2022-10-06

## 2022-10-06 DIAGNOSIS — Z12.31 VISIT FOR SCREENING MAMMOGRAM: Primary | ICD-10-CM

## 2022-10-21 ENCOUNTER — OFFICE VISIT (OUTPATIENT)
Dept: FAMILY MEDICINE CLINIC | Facility: CLINIC | Age: 47
End: 2022-10-21

## 2022-10-21 VITALS
OXYGEN SATURATION: 94 % | HEIGHT: 70 IN | BODY MASS INDEX: 27.92 KG/M2 | WEIGHT: 195 LBS | SYSTOLIC BLOOD PRESSURE: 132 MMHG | HEART RATE: 75 BPM | DIASTOLIC BLOOD PRESSURE: 80 MMHG | TEMPERATURE: 98.6 F | RESPIRATION RATE: 18 BRPM

## 2022-10-21 DIAGNOSIS — F41.8 MIXED ANXIETY DEPRESSIVE DISORDER: Primary | ICD-10-CM

## 2022-10-21 DIAGNOSIS — M72.2 PLANTAR FASCIITIS OF RIGHT FOOT: ICD-10-CM

## 2022-10-21 DIAGNOSIS — F98.8 ATTENTION DEFICIT DISORDER OF ADULT: ICD-10-CM

## 2022-10-21 PROCEDURE — 99214 OFFICE O/P EST MOD 30 MIN: CPT | Performed by: FAMILY MEDICINE

## 2022-10-21 RX ORDER — DESVENLAFAXINE 25 MG/1
25 TABLET, EXTENDED RELEASE ORAL DAILY
Qty: 90 TABLET | Refills: 1 | Status: SHIPPED | OUTPATIENT
Start: 2022-10-21 | End: 2022-11-23

## 2022-10-21 RX ORDER — METHYLPREDNISOLONE 4 MG/1
TABLET ORAL
Qty: 21 EACH | Refills: 0 | Status: SHIPPED | OUTPATIENT
Start: 2022-10-21 | End: 2022-11-23

## 2022-10-21 NOTE — PROGRESS NOTES
Subjective   Pooja Whitehead is a 47 y.o. female.     History of Present Illness     She is on pristiq 50 but is taking it QOD  When she takes it every day then she has fatigue.  She wonders if she can try the 25 mg daily    She is concerned about ADHD  Has had this diagnosed in the past  She has issues with getting task completed      For the past 6-9 months she has had plantar fasciitis in her right foot  Using home PT and night splint  The pain is getting progressive    The following portions of the patient's history were reviewed and updated as appropriate: allergies, current medications, past family history, past medical history, past social history, past surgical history and problem list.    Review of Systems   Constitutional: Negative.        Objective   Physical Exam  Vitals and nursing note reviewed.   Constitutional:       General: She is not in acute distress.     Appearance: Normal appearance. She is well-developed.   Cardiovascular:      Rate and Rhythm: Normal rate and regular rhythm.      Heart sounds: Normal heart sounds.   Pulmonary:      Effort: Pulmonary effort is normal.      Breath sounds: Normal breath sounds.   Neurological:      Mental Status: She is alert and oriented to person, place, and time.   Psychiatric:         Mood and Affect: Mood normal.         Behavior: Behavior normal.         Thought Content: Thought content normal.         Judgment: Judgment normal.         Assessment & Plan   Diagnoses and all orders for this visit:    1. Mixed anxiety depressive disorder (Primary)  -     Desvenlafaxine Succinate ER (Pristiq) 25 MG tablet sustained-release 24 hour; Take 1 tablet by mouth Daily.  Dispense: 90 tablet; Refill: 1    2. Attention deficit disorder of adult    3. Plantar fasciitis of right foot  -     methylPREDNISolone (MEDROL) 4 MG dose pack; Take as directed on package instructions.  Dispense: 21 each; Refill: 0    pristiq helping mood but some SE with daily use of 50, will try 25  QD    She has a lot of sensitivities to medicine.  Will try qelbree, 100 mg QD 2 weeks and then see if she can tolerate 200 mg.  She will call with issues.  strattera caused urinary retention and stimulants just made her feel very nervous in the past    Podiatry likely needed for plantar fasciitis.  Will try steroid taper but she has failed other conservative therapies

## 2022-11-07 ENCOUNTER — HOSPITAL ENCOUNTER (OUTPATIENT)
Dept: MAMMOGRAPHY | Facility: HOSPITAL | Age: 47
Discharge: HOME | End: 2022-11-07
Admitting: NURSE PRACTITIONER

## 2022-11-07 DIAGNOSIS — Z12.31 VISIT FOR SCREENING MAMMOGRAM: ICD-10-CM

## 2022-11-07 PROCEDURE — 77063 BREAST TOMOSYNTHESIS BI: CPT

## 2022-11-07 PROCEDURE — 77067 SCR MAMMO BI INCL CAD: CPT

## 2022-11-07 PROCEDURE — 77067 SCR MAMMO BI INCL CAD: CPT | Performed by: RADIOLOGY

## 2022-11-07 PROCEDURE — 77063 BREAST TOMOSYNTHESIS BI: CPT | Performed by: RADIOLOGY

## 2022-11-08 ENCOUNTER — TELEPHONE (OUTPATIENT)
Dept: FAMILY MEDICINE CLINIC | Facility: CLINIC | Age: 47
End: 2022-11-08

## 2022-11-08 DIAGNOSIS — M72.2 PLANTAR FASCIITIS OF RIGHT FOOT: ICD-10-CM

## 2022-11-08 DIAGNOSIS — F41.8 MIXED ANXIETY DEPRESSIVE DISORDER: Primary | ICD-10-CM

## 2022-11-08 NOTE — TELEPHONE ENCOUNTER
Caller: Pooja Whitehead    Relationship: Self    Best call back number: [phone]     What is the medical concern/diagnosis: PLANTAR FASCITIS AND DEPRESSION, ANXIETY, ADHD  What specialty or service is being requested: PODIATRIST AND BEHAVIORAL HEALTH     What is the provider, practice or medical service name: FOR BEHAVIORAL HEALTH SHE WOULD LIKE TO SEE MAGALY BETHEA WITH [facility]    Any additional details: FOR THE PODIATRIST SHE WOULD LIKE TO BE IN THE [place] OR [place] AREA.

## 2022-11-23 ENCOUNTER — TELEMEDICINE (OUTPATIENT)
Dept: PSYCHIATRY | Facility: CLINIC | Age: 47
End: 2022-11-23

## 2022-11-23 DIAGNOSIS — F43.81 GRIEF REACTION WITH PROLONGED BEREAVEMENT: ICD-10-CM

## 2022-11-23 DIAGNOSIS — F90.2 ADHD (ATTENTION DEFICIT HYPERACTIVITY DISORDER), COMBINED TYPE: Primary | ICD-10-CM

## 2022-11-23 DIAGNOSIS — F41.8 MIXED ANXIETY DEPRESSIVE DISORDER: ICD-10-CM

## 2022-11-23 DIAGNOSIS — Z79.899 ENCOUNTER FOR LONG-TERM (CURRENT) USE OF OTHER MEDICATIONS: ICD-10-CM

## 2022-11-23 PROCEDURE — 90792 PSYCH DIAG EVAL W/MED SRVCS: CPT | Performed by: NURSE PRACTITIONER

## 2022-11-23 NOTE — PROGRESS NOTES
"This provider is located at [facility], [address] [place] [address], [facility], [address] using a secure [employer] Video Visit through [employer]. Patient is being seen remotely via telehealth at their home address in [place], and stated they are in a secure environment for this session. The patient's condition being diagnosed/treated is appropriate for telemedicine. The provider identified herself as well as her credentials.   The patient, and/or patients guardian, consent to be seen remotely, and when consent is given they understand that the consent allows for patient identifiable information to be sent to a third party as needed.   They may refuse to be seen remotely at any time. The electronic data is encrypted and password protected, and the patient and/or guardian has been advised of the potential risks to privacy not withstanding such measures.   PT Identifiers used: Name and .    You have chosen to receive care through a telehealth visit.  Do you consent to use a video/audio connection for your medical care today? Yes      Subjective   Pooja Whitehead is a 47 y.o. female who presents today for initial evaluation     Chief Complaint:  \"grief, anxiety, depression, adhd\"     History of Present Illness:    History of Present Illness  Patient reported being treated for depressive anxiety and ADHD symptoms for several years.  Had a difficult first marriage, also her parents  when she was 6 years old.  She reported ADHD symptoms her whole life and she was not treated medically until she was an adult.  She reported symptoms of inattentiveness, being fidgety, difficulty with organization and focus and concentration.  Vyvanse was effective but insurance got to where it would not cover it.  She has tried other medications some with side effects that were intolerable.  Primary care gave him samples of Qelbree recently but patient reports she only took one of the 100 mg and she did not have any side effects but she did not " "want to continue the trial of the medication.  Depressive symptoms have been situational for the most part, she reports a lot of depressive symptoms in the last 2 years due to her father passing away, she reports \"he was my person.\"  She has a good relationship with her mother but her mother is an alcoholic.  Patient took care of her father purposefully during the last couple of months of his life.  He was waning in the nursing home during COVID due to not having any visitors, they were allowed in to see him because he was declining and she reported he perked up when he got visitation.  At that point she decided to purchase a house and live with him and take care of him.  She also moved her mother in with her as well.  The patient adopted her sister's child 14 years ago at birth.  She also has 3 biological children, 1 with her first , 2 with her second .  PHQ-9 today was done during session and recorded at 7, SHELLI-7 score was 9.  Patient referred to her father's death is traumatic for her.  She does have flashbacks at times she denies any nightmares.  Also during this time her  was in a motorcycle accident and had to have a right above-knee amputation and had several surgeries due to that accident.  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  It was a very stressful time for the patient.  Her  did homeschooled her children for 3 years they just went back to public school this year.  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  The patient denies she is ever had a head injury.  There is no history of seizures, there is a history of a stroke like event that was deemed to be a prodromal to a complicated migraine headache.  She denies any history of hallucinations or psychosis.  She denies any history of SI, HI.         The following portions of the patient's " history were reviewed and updated as appropriate: allergies, current medications, past family history, past medical history, past social history, past surgical history and problem list.    Past Psychiatric History:  Began Treatment:Several years ago  Diagnoses:Diagnosed with ADHD as a child, not treated medically.  Diagnosed with depression and anxiety several years ago.   Psychiatrist:Denies  Therapist:Brief marriage counseling in the past with her first marriage she did not find this effective.  Admission History:Denies  Medication Trials:Recent trial of Pristiq, she was doing 50 mg every other day and seem to be doing okay on it.  Primary care wrote for 25 mg daily and she reports she just has side effects with the medication and stopped taking it.  Vyvanse was trialed many years ago was deemed to be successful, highest dose was 60 mg daily.  Trintellix samples gave her a lot of nausea she lost 5 pounds, also gave her urinary retention.  Strattera was a good medicine for the attention deficit but she had severe urinary retention with this and had to stop taking it for that reason.  Trial of methylphenidate in the form of Concerta was deemed ineffective.  Generic Adderall was not very effective, and the higher doses gave her anxiety.  Lexapro gave significant weight gain although it was helpful for her symptoms.  Trial of Effexor she does not remember the results.  Trial of Zoloft reviewed reportedly gave her a lot of sedation even at the lowest dose of 25 mg.  She was on Wellbutrin for an extended length of time in the past.  Self Harm: Denies  Suicide Attempts:Denies   Psychosis, Anxiety, Depression: Some brief mood changes after each pregnancy.    Past Medical History:  Past Medical History:   Diagnosis Date   • ADHD (attention deficit hyperactivity disorder)    • Anxiety    • Disease of thyroid gland     Multinodular goitor   • Factor 5 Leiden mutation, heterozygous (HCC)    • Headache         Substance Abuse History:   Types:Denies all, including illicit      Social History:  Social History     Socioeconomic History   • Marital status:    • Number of children: 4   • Highest education level: Master's degree (e.g., MA, MS, Rafia, MEd, MSW, GAVI)   Tobacco Use   • Smoking status: Never   • Smokeless tobacco: Never   Substance and Sexual Activity   • Alcohol use: No   • Drug use: No   • Sexual activity: Yes     Birth control/protection: Surgical     Comment: TUBAL    Patient owns her own business.  Denies any history of  service, denies any legal problems.  Spouse is very supportive.  Endorses [employer].  Adopted her sister's male child at the child's birth.    Family History:  Family History   Problem Relation Age of Onset   • Alcohol abuse Mother    • Hypertension Mother    • Hyperlipidemia Mother    • Aortic aneurysm Mother    • Parkinsonism Father    • Ki Parkinson White syndrome Father    • Hemochromatosis Father    • OCD Sister    • Breast cancer Paternal Aunt         DX AGE 40'S   • Breast cancer Paternal Aunt         DX AGE 40'S   • Breast cancer Paternal Grandmother         DX AGE POST MENOPAUSAL   • ADD / ADHD Son    • Ovarian cancer Neg Hx        Past Surgical History:  Past Surgical History:   Procedure Laterality Date   • AUGMENTATION MAMMAPLASTY Bilateral     SILICONE , retropectoral   •  SECTION      three total   • D & C HYSTEROSCOPY ENDOMETRIAL ABLATION N/A 2017    Procedure: DILATATION AND CURETTAGE HYSTEROSCOPY NOVASURE ENDOMETRIAL ABLATION;  Surgeon: Toshia Morrell DO;  Location: [facility] OR;  Service:    • TUBAL ABDOMINAL LIGATION         Problem List:  Patient Active Problem List   Diagnosis   • Migraine without aura and without status migrainosus, not intractable   • Restless leg syndrome   • Attention deficit disorder of adult   • Mixed anxiety depressive disorder   • Factor V Leiden mutation (HCC)   • Non-toxic multinodular goiter   •  Bilateral hearing loss       Allergy:   Allergies   Allergen Reactions   • Strattera [Atomoxetine Hcl] Urinary Retention        Current Medications:   Current Outpatient Medications   Medication Sig Dispense Refill   • SUMAtriptan (IMITREX) 100 MG tablet Take 1 tablet by mouth Every 2 (Two) Hours As Needed for Migraine. 9 tablet 5   • lisdexamfetamine (Vyvanse) 40 MG capsule Take 1 capsule by mouth Every Morning 30 capsule 0     No current facility-administered medications for this visit.       Review of Systems:    Review of Systems   Constitutional: Positive for fatigue.   Psychiatric/Behavioral: Positive for decreased concentration, depressed mood and stress. The patient is nervous/anxious.    All other systems reviewed and are negative.        Physical Exam:   Physical Exam  Vitals reviewed.   Constitutional:       Appearance: Normal appearance.   HENT:      Head: Normocephalic.   Neurological:      Mental Status: She is alert.   Psychiatric:         Attention and Perception: Attention and perception normal.         Mood and Affect: Affect normal. Mood is anxious and depressed.         Speech: Speech normal.         Behavior: Behavior normal. Behavior is cooperative.         Thought Content: Thought content normal.         Cognition and Memory: Cognition and memory normal.         Judgment: Judgment normal.         Vitals:  not currently breastfeeding. There is no height or weight on file to calculate BMI.  Due to extenuating circumstances and possible current health risks associated with the patient being present in a clinical setting (with current health restrictions in place in regards to possible COVID 19 transmission/exposure), the patient was seen remotely today via a MyChart Video Visit through [facility] and telephone encounter.  Unable to obtain vital signs due to nature of remote visit.  Height stated at 70 inches.  Weight stated at 195 pounds.    Last 3 Blood Pressure Readings:  BP Readings from Last 3  Encounters:   10/21/22 132/80   08/19/22 128/86   07/19/18 116/74       PHQ-9 Score:   PHQ-9 Total Score:   PHQ-9 Depression Screening  Little interest or pleasure in doing things?    1   Feeling down, depressed, or hopeless?    1   Trouble falling or staying asleep, or sleeping too much?  0   Feeling tired or having little energy?  1   Poor appetite or overeating?  0   Feeling bad about yourself - or that you are a failure or have let yourself or your family down?  1   Trouble concentrating on things, such as reading the newspaper or watching television?  3   Moving or speaking so slowly that other people could have noticed? Or the opposite - being so fidgety or restless that you have been moving around a lot more than usual?  0   Thoughts that you would be better off dead, or of hurting yourself in some way?  0   PHQ-9 Total Score  7   If you checked off any problems, how difficult have these problems made it for you to do your work, take care of things at home, or get along with other people?   somewhat difficult     PHQ-9 Total Score:    7      Feeling nervous, anxious or on edge: (P) Several days  Not being able to stop or control worrying: (P) Several days  Worrying too much about different things: (P) Several days  Trouble Relaxing: (P) Several days  Being so restless that it is hard to sit still: (P) Several days  Feeling afraid as if something awful might happen: (P) Several days  Becoming easily annoyed or irritable: (P) Nearly every day  SHELLI 7 Total Score: (P) 9  If you checked any problems, how difficult have these problems made it for you to do your work, take care of things at home, or get along with other people: (P) Somewhat difficult      PROMIS scale screening tool that patient filled out virtually reviewed by this APRN at today's encounter.           Mental Status Exam:   Hygiene:   good  Cooperation:  Cooperative  Eye Contact:  Good  Psychomotor Behavior:  Appropriate  Affect:  Full range  Mood:  sad and anxious  Hopelessness: Denies  Speech:  Normal  Thought Process:  Goal directed and Linear  Thought Content:  Normal  Suicidal:  None  Homicidal:  None  Hallucinations:  None  Delusion:  None  Memory:  Intact  Orientation:  Person, Place, Time and Situation  Reliability:  good  Insight:  Good  Judgement:  Good  Impulse Control:  Good  Physical/Medical Issues:  No        Lab Results:   Results Encounter on 08/19/2022   Component Date Value Ref Range Status   • Cologuard 09/07/2022 Negative  Negative Final    Comment:   NEGATIVE TEST RESULT. A negative Cologuard result indicates a low likelihood that a colorectal cancer (CRC) or advanced adenoma (adenomatous polyps with more advanced pre-malignant features)  is present. The chance that a person with a negative Cologuard test has a colorectal cancer is less than 1 in 1500 (negative predictive value >99.9%) or has an  advanced adenoma is less than  5.3% (negative predictive value 94.7%). These data are based on a prospective cross-sectional study of 10,000 individuals at average risk for colorectal cancer who were screened with both Cologuard and colonoscopy. (Sina Pennington al, N Engl J Med 2014;370(14):7013-8181) The normal value (reference range) for this assay is negative.    COLOGUARD RE-SCREENING RECOMMENDATION: Periodic colorectal cancer screening is an important part of preventive healthcare for asymptomatic individuals at average risk for colorectal cancer.  Following a negative Cologuard result, the American Cancer Society and U.S.                            Multi-Society Task Force screening guidelines recommend a Cologuard re-screening interval of 3 years.   References: American Cancer Society Guideline for Colorectal Cancer Screening: https://www.cancer.org/cancer/colon-rectal-cancer/nrewertwy-hcqfgpwso-wbdbxhu/acs-recommendations.html.; Ney OTERO, Sonny ARREOLA, Malathi LAINEZ, Colorectal Cancer Screening: Recommendations for Physicians and Patients from  the U.S. Multi-Society Task Force on Colorectal Cancer Screening , Am J Gastroenterology 2017; 112:0217-8233.    TEST DESCRIPTION: Composite algorithmic analysis of stool DNA-biomarkers with hemoglobin immunoassay.   Quantitative values of individual biomarkers are not reportable and are not associated with individual biomarker result reference ranges. Cologuard is intended for colorectal cancer screening of adults of either sex, 45 years or older, who are at average-risk for colorectal cancer (CRC). Cologuard has been approved for use by the U.S. FDA. The performance of Cologuard was                            established in a cross sectional study of average-risk adults aged 50-84. Cologuard performance in patients ages 45 to 49 years was estimated by sub-group analysis of near-age groups. Colonoscopies performed for a positive result may find as the most clinically significant lesion: colorectal cancer [4.0%], advanced adenoma (including sessile serrated polyps greater than or equal to 1cm diameter) [20%] or non- advanced adenoma [31%]; or no colorectal neoplasia [45%]. These estimates are derived from a prospective cross-sectional screening study of 10,000 individuals at average risk for colorectal cancer who were screened with both Cologuard and colonoscopy. (Sina BELCHER. et al, N Engl J Med 2014;370(14):4564-8287.) Cologuard may produce a false negative or false positive result (no colorectal cancer or precancerous polyp present at colonoscopy follow up). A negative Cologuard test result does not guarantee the absence of CRC or advanced adenoma (pre-cancer). The current Cologuard                            screening interval is every 3 years. (American Cancer Society and U.S. Multi-Society Task Force). Cologuard performance data in a 10,000 patient pivotal study using colonoscopy as the reference method can be accessed at the following location: [email or web address]/results. Additional description of the  Cologuard test process, warnings and precautions can be found at www.[employer].     Office Visit on 08/19/2022   Component Date Value Ref Range Status   • WBC 09/30/2022 7.8  3.4 - 10.8 x10E3/uL Final   • RBC 09/30/2022 5.25  3.77 - 5.28 x10E6/uL Final   • Hemoglobin 09/30/2022 14.5  11.1 - 15.9 g/dL Final   • Hematocrit 09/30/2022 43.9  34.0 - 46.6 % Final   • MCV 09/30/2022 84  79 - 97 fL Final   • MCH 09/30/2022 27.6  26.6 - 33.0 pg Final   • MCHC 09/30/2022 33.0  31.5 - 35.7 g/dL Final   • RDW 09/30/2022 13.2  11.7 - 15.4 % Final   • Platelets 09/30/2022 242  150 - 450 x10E3/uL Final   • Neutrophil Rel % 09/30/2022 67  Not Estab. % Final   • Lymphocyte Rel % 09/30/2022 23  Not Estab. % Final   • Monocyte Rel % 09/30/2022 8  Not Estab. % Final   • Eosinophil Rel % 09/30/2022 1  Not Estab. % Final   • Basophil Rel % 09/30/2022 1  Not Estab. % Final   • Neutrophils Absolute 09/30/2022 5.2  1.4 - 7.0 x10E3/uL Final   • Lymphocytes Absolute 09/30/2022 1.8  0.7 - 3.1 x10E3/uL Final   • Monocytes Absolute 09/30/2022 0.7  0.1 - 0.9 x10E3/uL Final   • Eosinophils Absolute 09/30/2022 0.1  0.0 - 0.4 x10E3/uL Final   • Basophils Absolute 09/30/2022 0.0  0.0 - 0.2 x10E3/uL Final   • Immature Granulocyte Rel % 09/30/2022 0  Not Estab. % Final   • Immature Grans Absolute 09/30/2022 0.0  0.0 - 0.1 x10E3/uL Final   • Glucose 09/30/2022 80  70 - 99 mg/dL Final                  **Please note reference interval change**   • BUN 09/30/2022 10  6 - 24 mg/dL Final   • Creatinine 09/30/2022 0.76  0.57 - 1.00 mg/dL Final   • EGFR Result 09/30/2022 98  >59 mL/min/1.73 Final   • BUN/Creatinine Ratio 09/30/2022 13  9 - 23 Final   • Sodium 09/30/2022 139  134 - 144 mmol/L Final   • Potassium 09/30/2022 4.4  3.5 - 5.2 mmol/L Final   • Chloride 09/30/2022 100  96 - 106 mmol/L Final   • Total CO2 09/30/2022 22  20 - 29 mmol/L Final   • Calcium 09/30/2022 9.4  8.7 - 10.2 mg/dL Final   • Total Protein 09/30/2022 7.6  6.0 - 8.5 g/dL Final   •  Albumin 09/30/2022 4.7  3.8 - 4.8 g/dL Final   • Globulin 09/30/2022 2.9  1.5 - 4.5 g/dL Final   • A/G Ratio 09/30/2022 1.6  1.2 - 2.2 Final   • Total Bilirubin 09/30/2022 0.5  0.0 - 1.2 mg/dL Final   • Alkaline Phosphatase 09/30/2022 78  44 - 121 IU/L Final   • AST (SGOT) 09/30/2022 17  0 - 40 IU/L Final   • ALT (SGPT) 09/30/2022 19  0 - 32 IU/L Final   • TSH 09/30/2022 0.470  0.450 - 4.500 uIU/mL Final   • Free T4 09/30/2022 1.08  0.82 - 1.77 ng/dL Final   • Hep C Virus Ab 09/30/2022 <0.1  0.0 - 0.9 s/co ratio Final    Comment:                                   Negative:     < 0.8                               Indeterminate: 0.8 - 0.9                                    Positive:     > 0.9   HCV antibody alone does not differentiate between   previous resolved infection and active infection.   The CDC and current clinical guidelines recommend   that a positive HCV antibody result be followed up   with an HCV RNA test to support the diagnosis of   acute HCV infection. [facility] offers Hepatitis C   Virus (HCV) RNA, Diagnosis, CARY (858189) and   Hepatitis C Virus (HCV) Antibody with reflex to   Quantitative Real-time PCR (612089).     • Total Cholesterol 09/30/2022 233 (H)  100 - 199 mg/dL Final   • Triglycerides 09/30/2022 92  0 - 149 mg/dL Final   • HDL Cholesterol 09/30/2022 49  >39 mg/dL Final   • VLDL Cholesterol Alex 09/30/2022 16  5 - 40 mg/dL Final   • LDL Chol Calc (NIH) 09/30/2022 168 (H)  0 - 99 mg/dL Final         Assessment & Plan   Diagnoses and all orders for this visit:    1. ADHD (attention deficit hyperactivity disorder), combined type (Primary)  -     lisdexamfetamine (Vyvanse) 40 MG capsule; Take 1 capsule by mouth Every Morning  Dispense: 30 capsule; Refill: 0  -     Ambulatory Referral to Behavioral Health    2. Grief reaction with prolonged bereavement    3. Mixed anxiety depressive disorder    4. Encounter for long-term (current) use of other medications  -     Urine Drug Screen - Urine, Clean  "Catch; Future        Visit Diagnoses:    ICD-10-CM ICD-9-CM   1. ADHD (attention deficit hyperactivity disorder), combined type  F90.2 314.01   2. Grief reaction with prolonged bereavement  F43.81 309.0   3. Mixed anxiety depressive disorder  F41.8 300.4   4. Encounter for long-term (current) use of other medications  Z79.899 V58.69         GOALS:  Short Term Goals: Patient will be compliant with medication, and patient will have no significant medication related side effects.  Patient will be engaged in psychotherapy as indicated.  Patient will report subjective improvement of symptoms.  Long term goals: To stabilize mood and treat/improve subjective symptoms, the patient will stay out of the hospital, the patient will be at an optimal level of functioning, and the patient will take all medications as prescribed.  The patient/guardian verbalized understanding and agreement with goals that were mutually set.    RISK ASSESSMENT  Current harm-to-self risk is reported by pt as \"none.\"  Current [id number]-[id number] risk is reported by pt as \"none.\"   No suicidal thoughts, intent, plan is appreciated by this provider on this date of exam.   No homicidal thoughts, intent, plan is appreciated by this provider on this date.    TREATMENT PLAN: Continue supportive psychotherapy efforts and medications as indicated.  Pharmacological and Non-Pharmacological treatment options discussed during today's visit. Patient/Guardian acknowledged and verbally consented with current treatment plan and was educated on the importance of compliance with treatment and follow-up appointments.      MEDICATION ISSUES:  Discussed medication options and treatment plan of prescribed medication as well as the risks, benefits, any black box warnings, and side effects including potential falls, possible impaired driving, and metabolic adversities among others. Patient is agreeable to call the office with any worsening of symptoms or onset of side effects, or " if any concerns or questions arise.  The contact information for the office is made available to the patient. Patient is agreeable to call 911 or go to the nearest ER should they begin having any SI/HI, or if any urgent concerns arise. No medication side effects or related complaints today.     Start Vyvanse as below  Referral for counseling  MEDS ORDERED DURING VISIT:  New Medications Ordered This Visit   Medications   • lisdexamfetamine (Vyvanse) 40 MG capsule     Sig: Take 1 capsule by mouth Every Morning     Dispense:  30 capsule     Refill:  0       Follow Up Appointment:   Return in about 26 days (around 12/19/2022) for Recheck, Video visit.               This document has been electronically signed by YOMAIRA Hayden  November 23, 2022 10:47 EST    Dictated Utilizing Dragon Dictation: Part of this note may be an electronic transcription/translation of spoken language to printed text using the Dragon Dictation System.

## 2022-12-19 ENCOUNTER — TELEMEDICINE (OUTPATIENT)
Dept: PSYCHIATRY | Facility: CLINIC | Age: 47
End: 2022-12-19

## 2022-12-19 DIAGNOSIS — F90.2 ADHD (ATTENTION DEFICIT HYPERACTIVITY DISORDER), COMBINED TYPE: Primary | ICD-10-CM

## 2022-12-19 DIAGNOSIS — F43.81 GRIEF REACTION WITH PROLONGED BEREAVEMENT: ICD-10-CM

## 2022-12-19 PROCEDURE — 99214 OFFICE O/P EST MOD 30 MIN: CPT | Performed by: NURSE PRACTITIONER

## 2022-12-19 RX ORDER — PROGESTERONE 100 MG/1
100 CAPSULE ORAL
COMMUNITY
Start: 2022-11-29

## 2022-12-19 NOTE — PROGRESS NOTES
"This provider is located at [facility], [address] [place] [address], [facility], [id number] using a secure MyChart Video Visit through [employer]. Patient is being seen remotely via telehealth at their home address in [place], and stated they are in a secure environment for this session. The patient's condition being diagnosed/treated is appropriate for telemedicine. The provider identified herself as well as her credentials.   The patient, and/or patients guardian, consent to be seen remotely, and when consent is given they understand that the consent allows for patient identifiable information to be sent to a third party as needed.   They may refuse to be seen remotely at any time. The electronic data is encrypted and password protected, and the patient and/or guardian has been advised of the potential risks to privacy not withstanding such measures.   PT Identifiers used: Name and .    You have chosen to receive care through a telehealth visit.  Do you consent to use a video/audio connection for your medical care today? Yes      Subjective   Pooja Whitehead is a 47 y.o. female who presents today for follow up    Chief Complaint:  \"grief, adhd\"     History of Present Illness:    History of Present Illness  Patient states that the Vyvanse at 40 mg the first few doses were beneficial, but subsequent doses seemed to be producing minimal results.  She is not taking it every day, only days where she works.  Stated  did not think the medicine was helping her very much.  No headaches or chest pain shortness of breath no heart palpitations are reported.  No change in sleep pattern.  Is scheduled to have therapy intake at the beginning of February to address grief.  She thinks overall the Vyvanse is helping some as she is able to keep her documentation more up-to-date on at least half of her charts.  Instructed to take the medication on a daily basis and see if she sees better results.  We will remain on 40 mg at this time.  " Urine drug screen results are not available yet.        The following portions of the patient's history were reviewed and updated as appropriate: allergies, current medications, past family history, past medical history, past social history, past surgical history and problem list.    Past Psychiatric History:  Began Treatment:Several years ago  Diagnoses:Diagnosed with ADHD as a child, not treated medically.  Diagnosed with depression and anxiety several years ago.   Psychiatrist:[facility]  Therapist:Brief marriage counseling in the past with her first marriage she did not find this effective.  Admission History:Denies  Medication Trials:Recent trial of Pristiq, she was doing 50 mg every other day and seem to be doing okay on it.  Primary care wrote for 25 mg daily and she reports she just has side effects with the medication and stopped taking it.  Vyvanse was trialed many years ago was deemed to be successful, highest dose was 60 mg daily.  Trintellix samples gave her a lot of nausea she lost 5 pounds, also gave her urinary retention.  Strattera was a good medicine for the attention deficit but she had severe urinary retention with this and had to stop taking it for that reason.  Trial of methylphenidate in the form of Concerta was deemed ineffective.  Generic Adderall was not very effective, and the higher doses gave her anxiety.  Lexapro gave significant weight gain although it was helpful for her symptoms.  Trial of Effexor she does not remember the results.  Trial of Zoloft reviewed reportedly gave her a lot of sedation even at the lowest dose of 25 mg.  She was on Wellbutrin for an extended length of time in the past.  Self Harm: Denies  Suicide Attempts:Denies   Psychosis, Anxiety, Depression: Some brief mood changes after each pregnancy.    Past Medical History:  Past Medical History:   Diagnosis Date   • ADHD (attention deficit hyperactivity disorder)    • Anxiety    • Disease of thyroid gland      Multinodular goitor   • Factor 5 Leiden mutation, heterozygous (HCC)    • Headache        Substance Abuse History:   Types:Denies all, including illicit      Social History:  Social History     Socioeconomic History   • Marital status:    • Number of children: 4   • Highest education level: Master's degree (e.g., MA, MS, Rafia, MEd, MSW, GAVI)   Tobacco Use   • Smoking status: Never   • Smokeless tobacco: Never   Substance and Sexual Activity   • Alcohol use: No   • Drug use: No   • Sexual activity: Yes     Birth control/protection: Surgical     Comment: TUBAL    Patient owns her own business.  Denies any history of  service, denies any legal problems.  Spouse is very supportive.  Endorses [employer] elisha.  Adopted her sister's male child at the child's birth.    Family History:  Family History   Problem Relation Age of Onset   • Alcohol abuse Mother    • Hypertension Mother    • Hyperlipidemia Mother    • Aortic aneurysm Mother    • Parkinsonism Father    • Ki Parkinson White syndrome Father    • Hemochromatosis Father    • OCD Sister    • Breast cancer Paternal Aunt         DX AGE 40'S   • Breast cancer Paternal Aunt         DX AGE 40'S   • Breast cancer Paternal Grandmother         DX AGE POST MENOPAUSAL   • ADD / ADHD Son    • Ovarian cancer Neg Hx        Past Surgical History:  Past Surgical History:   Procedure Laterality Date   • AUGMENTATION MAMMAPLASTY Bilateral     SILICONE , retropectoral   •  SECTION      three total   • D & C HYSTEROSCOPY ENDOMETRIAL ABLATION N/A 2017    Procedure: DILATATION AND CURETTAGE HYSTEROSCOPY NOVASURE ENDOMETRIAL ABLATION;  Surgeon: Toshia Morrell DO;  Location: [facility] OR;  Service:    • TUBAL ABDOMINAL LIGATION         Problem List:  Patient Active Problem List   Diagnosis   • Migraine without aura and without status migrainosus, not intractable   • Restless leg syndrome   • Attention deficit disorder of adult   • Mixed anxiety depressive  disorder   • Factor V Leiden mutation (HCC)   • Non-toxic multinodular goiter   • Bilateral hearing loss       Allergy:   Allergies   Allergen Reactions   • Strattera [Atomoxetine Hcl] Urinary Retention        Current Medications:   Current Outpatient Medications   Medication Sig Dispense Refill   • lisdexamfetamine (Vyvanse) 40 MG capsule Take 1 capsule by mouth Every Morning 30 capsule 0   • Progesterone (PROMETRIUM) 100 MG capsule Take 100 mg by mouth every night at bedtime.     • SUMAtriptan (IMITREX) 100 MG tablet Take 1 tablet by mouth Every 2 (Two) Hours As Needed for Migraine. 9 tablet 5   • lisdexamfetamine (Vyvanse) 40 MG capsule Take 1 capsule by mouth Every Morning 30 capsule 0     No current facility-administered medications for this visit.       Review of Systems:    Review of Systems   Constitutional: Positive for fatigue.   Psychiatric/Behavioral: Positive for decreased concentration and stress.   All other systems reviewed and are negative.        Physical Exam:   Physical Exam  Vitals reviewed.   Constitutional:       Appearance: Normal appearance.   HENT:      Head: Normocephalic.   Neurological:      Mental Status: She is alert.   Psychiatric:         Attention and Perception: Attention and perception normal.         Mood and Affect: Mood and affect normal.         Speech: Speech normal.         Behavior: Behavior normal. Behavior is cooperative.         Thought Content: Thought content normal.         Cognition and Memory: Cognition and memory normal.         Judgment: Judgment normal.         Vitals:  not currently breastfeeding. There is no height or weight on file to calculate BMI.  Due to extenuating circumstances and possible current health risks associated with the patient being present in a clinical setting (with current health restrictions in place in regards to possible COVID 19 transmission/exposure), the patient was seen remotely today via a MyChart Video Visit through [facility] and  telephone encounter.  Unable to obtain vital signs due to nature of remote visit.  Height stated at 70 inches.  Weight stated at 195 pounds.    Last 3 Blood Pressure Readings:  BP Readings from Last 3 Encounters:   10/21/22 132/80   08/19/22 128/86   07/19/18 116/74       PHQ-9 Score:   PHQ-9 Total Score:                   Mental Status Exam:   Hygiene:   good  Cooperation:  Cooperative  Eye Contact:  Good  Psychomotor Behavior:  Appropriate  Affect:  Full range  Mood: normal  Hopelessness: Denies  Speech:  Normal  Thought Process:  Goal directed and Linear  Thought Content:  Normal  Suicidal:  None  Homicidal:  None  Hallucinations:  None  Delusion:  None  Memory:  Intact  Orientation:  Person, Place, Time and Situation  Reliability:  good  Insight:  Good  Judgement:  Good  Impulse Control:  Good  Physical/Medical Issues:  No        Lab Results:   No visits with results within 1 Month(s) from this visit.   Latest known visit with results is:   Results Encounter on 08/19/2022   Component Date Value Ref Range Status   • Cologuard 09/07/2022 Negative  Negative Final    Comment:   NEGATIVE TEST RESULT. A negative Cologuard result indicates a low likelihood that a colorectal cancer (CRC) or advanced adenoma (adenomatous polyps with more advanced pre-malignant features)  is present. The chance that a person with a negative Cologuard test has a colorectal cancer is less than 1 in 1500 (negative predictive value >99.9%) or has an  advanced adenoma is less than  5.3% (negative predictive value 94.7%). These data are based on a prospective cross-sectional study of 10,000 individuals at average risk for colorectal cancer who were screened with both Cologuard and colonoscopy. (Sina Peninngton al, N Engl J Med 2014;370(14):8709-1091) The normal value (reference range) for this assay is negative.    COLOGUARD RE-SCREENING RECOMMENDATION: Periodic colorectal cancer screening is an important part of preventive healthcare for  asymptomatic individuals at average risk for colorectal cancer.  Following a negative Cologuard result, the American Cancer Society and U.S.                            Multi-Society Task Force screening guidelines recommend a Cologuard re-screening interval of 3 years.   References: American Cancer Society Guideline for Colorectal Cancer Screening: https://www.cancer.org/cancer/colon-rectal-cancer/gdzaasrgq-zpliyvdyd-rqctbnd/acs-recommendations.html.; Ney DK, Sonny CR, Malathi THAKURK, Colorectal Cancer Screening: Recommendations for Physicians and Patients from the U.S. Multi-Society Task Force on Colorectal Cancer Screening , Am J Gastroenterology 2017; 112:8042-7617.    TEST DESCRIPTION: Composite algorithmic analysis of stool DNA-biomarkers with hemoglobin immunoassay.   Quantitative values of individual biomarkers are not reportable and are not associated with individual biomarker result reference ranges. Cologuard is intended for colorectal cancer screening of adults of either sex, 45 years or older, who are at average-risk for colorectal cancer (CRC). Cologuard has been approved for use by the U.S. FDA. The performance of Cologuard was                            established in a cross sectional study of average-risk adults aged 50-84. Cologuard performance in patients ages 45 to 49 years was estimated by sub-group analysis of near-age groups. Colonoscopies performed for a positive result may find as the most clinically significant lesion: colorectal cancer [4.0%], advanced adenoma (including sessile serrated polyps greater than or equal to 1cm diameter) [20%] or non- advanced adenoma [31%]; or no colorectal neoplasia [45%]. These estimates are derived from a prospective cross-sectional screening study of 10,000 individuals at average risk for colorectal cancer who were screened with both Cologuard and colonoscopy. (Sina Pennington al, N Engl J Med 2014;370(14):7224-2704.) Cologuard may produce a false negative  "or false positive result (no colorectal cancer or precancerous polyp present at colonoscopy follow up). A negative Cologuard test result does not guarantee the absence of CRC or advanced adenoma (pre-cancer). The current Cologuard                            screening interval is every 3 years. (American Cancer Society and U.S. Multi-Society Task Force). Cologuard performance data in a 10,000 patient pivotal study using colonoscopy as the reference method can be accessed at the following location: www.[employer]eme/results. Additional description of the Cologuard test process, warnings and precautions can be found at www.[employer].           Assessment & Plan   Diagnoses and all orders for this visit:    1. ADHD (attention deficit hyperactivity disorder), combined type (Primary)  -     lisdexamfetamine (Vyvanse) 40 MG capsule; Take 1 capsule by mouth Every Morning  Dispense: 30 capsule; Refill: 0    2. Grief reaction with prolonged bereavement        Visit Diagnoses:    ICD-10-CM ICD-9-CM   1. ADHD (attention deficit hyperactivity disorder), combined type  F90.2 314.01   2. Grief reaction with prolonged bereavement  F43.81 309.0         GOALS:  Short Term Goals: Patient will be compliant with medication, and patient will have no significant medication related side effects.  Patient will be engaged in psychotherapy as indicated.  Patient will report subjective improvement of symptoms.  Long term goals: To stabilize mood and treat/improve subjective symptoms, the patient will stay out of the hospital, the patient will be at an optimal level of functioning, and the patient will take all medications as prescribed.  The patient/guardian verbalized understanding and agreement with goals that were mutually set.    RISK ASSESSMENT  Current harm-to-self risk is reported by pt as \"none.\"  Current [id number] risk is reported by pt as \"none.\"   No suicidal thoughts, intent, plan is appreciated by this provider on this " date of exam.   No homicidal thoughts, intent, plan is appreciated by this provider on this date.    TREATMENT PLAN: Continue supportive psychotherapy efforts and medications as indicated.  Pharmacological and Non-Pharmacological treatment options discussed during today's visit. Patient/Guardian acknowledged and verbally consented with current treatment plan and was educated on the importance of compliance with treatment and follow-up appointments.      MEDICATION ISSUES:  Discussed medication options and treatment plan of prescribed medication as well as the risks, benefits, any black box warnings, and side effects including potential falls, possible impaired driving, and metabolic adversities among others. Patient is agreeable to call the office with any worsening of symptoms or onset of side effects, or if any concerns or questions arise.  The contact information for the office is made available to the patient. Patient is agreeable to call 911 or go to the nearest ER should they begin having any SI/HI, or if any urgent concerns arise. No medication side effects or related complaints today.     Continue Vyvanse 40mg QAM    MEDS ORDERED DURING VISIT:  New Medications Ordered This Visit   Medications   • lisdexamfetamine (Vyvanse) 40 MG capsule     Sig: Take 1 capsule by mouth Every Morning     Dispense:  30 capsule     Refill:  0     Fill when due       Follow Up Appointment:   Return in about 5 weeks (around 1/23/2023) for Recheck, Video visit.               This document has been electronically signed by YOMAIRA Hayden  December 19, 2022 09:58 EST    Dictated Utilizing Dragon Dictation: Part of this note may be an electronic transcription/translation of spoken language to printed text using the Dragon Dictation System.

## 2023-01-23 ENCOUNTER — TELEMEDICINE (OUTPATIENT)
Dept: PSYCHIATRY | Facility: CLINIC | Age: 48
End: 2023-01-23
Payer: MEDICAID

## 2023-01-23 DIAGNOSIS — F90.2 ADHD (ATTENTION DEFICIT HYPERACTIVITY DISORDER), COMBINED TYPE: ICD-10-CM

## 2023-01-23 PROCEDURE — 99213 OFFICE O/P EST LOW 20 MIN: CPT | Performed by: NURSE PRACTITIONER

## 2023-01-23 NOTE — PATIENT INSTRUCTIONS
For concerns or needing assistance call the Behavioral Health [facility] at [phone]

## 2023-01-23 NOTE — PROGRESS NOTES
"This provider is located at [facility], [address] [place] [address], [facility], [id number] using a secure MyChart Video Visit through [employer]. Patient is being seen remotely via telehealth at their home address in [place], and stated they are in a secure environment for this session. The patient's condition being diagnosed/treated is appropriate for telemedicine. The provider identified herself as well as her credentials.   The patient, and/or patients guardian, consent to be seen remotely, and when consent is given they understand that the consent allows for patient identifiable information to be sent to a third party as needed.   They may refuse to be seen remotely at any time. The electronic data is encrypted and password protected, and the patient and/or guardian has been advised of the potential risks to privacy not withstanding such measures.   PT Identifiers used: Name and .    You have chosen to receive care through a telehealth visit.  Do you consent to use a video/audio connection for your medical care today? Yes      Subjective   Pooja Whitehead is a 47 y.o. female who presents today for follow up    Chief Complaint:  \" adhd\"     History of Present Illness:    History of Present Illness  Improvement in focus and concentration when taking the Vyvanse 40 mg on a daily basis.  Reports she is up-to-date on all of her documentation, she also notices that she is able to do tasks like going to the grocery store a lot easier, she can remain focused and not want to wander all over the store.  I still do not have the results from the drug screen she did get this done per her report.  Called [facility] after finishing with patient interview and they were going to fax to our office.  I did get the results and I did review them.           The following portions of the patient's history were reviewed and updated as appropriate: allergies, current medications, past family history, past medical history, past social history, past " surgical history and problem list.    Past Psychiatric History:  Began Treatment:Several years ago  Diagnoses:Diagnosed with ADHD as a child, not treated medically.  Diagnosed with depression and anxiety several years ago.   Psychiatrist:[facility]  Therapist:Brief marriage counseling in the past with her first marriage she did not find this effective.  Admission History:Denies  Medication Trials:Recent trial of Pristiq, she was doing 50 mg every other day and seem to be doing okay on it.  Primary care wrote for 25 mg daily and she reports she just has side effects with the medication and stopped taking it.  Vyvanse was trialed many years ago was deemed to be successful, highest dose was 60 mg daily.  Trintellix samples gave her a lot of nausea she lost 5 pounds, also gave her urinary retention.  Strattera was a good medicine for the attention deficit but she had severe urinary retention with this and had to stop taking it for that reason.  Trial of methylphenidate in the form of Concerta was deemed ineffective.  Generic Adderall was not very effective, and the higher doses gave her anxiety.  Lexapro gave significant weight gain although it was helpful for her symptoms.  Trial of Effexor she does not remember the results.  Trial of Zoloft reviewed reportedly gave her a lot of sedation even at the lowest dose of 25 mg.  She was on Wellbutrin for an extended length of time in the past.  Self Harm: Denies  Suicide Attempts:Denies   Psychosis, Anxiety, Depression: Some brief mood changes after each pregnancy.    Past Medical History:  Past Medical History:   Diagnosis Date   • ADHD (attention deficit hyperactivity disorder)    • Anxiety    • Disease of thyroid gland     Multinodular goitor   • Factor 5 Leiden mutation, heterozygous (HCC)    • Headache        Substance Abuse History:   Types:Denies all, including illicit      Social History:  Social History     Socioeconomic History   • Marital status:    •  Number of children: 4   • Highest education level: Master's degree (e.g., MA, MS, Rafia, MEd, MSW, GAVI)   Tobacco Use   • Smoking status: Never   • Smokeless tobacco: Never   Substance and Sexual Activity   • Alcohol use: No   • Drug use: No   • Sexual activity: Yes     Birth control/protection: Surgical     Comment: TUBAL    Patient owns her own business.  Denies any history of  service, denies any legal problems.  Spouse is very supportive.  Endorses Religion elisha.  Adopted her sister's male child at the child's birth.    Family History:  Family History   Problem Relation Age of Onset   • Alcohol abuse Mother    • Hypertension Mother    • Hyperlipidemia Mother    • Aortic aneurysm Mother    • Parkinsonism Father    • Ki Parkinson White syndrome Father    • Hemochromatosis Father    • OCD Sister    • Breast cancer Paternal Aunt         DX AGE 40'S   • Breast cancer Paternal Aunt         DX AGE 40'S   • Breast cancer Paternal Grandmother         DX AGE POST MENOPAUSAL   • ADD / ADHD Son    • Ovarian cancer Neg Hx        Past Surgical History:  Past Surgical History:   Procedure Laterality Date   • AUGMENTATION MAMMAPLASTY Bilateral     SILICONE , retropectoral   •  SECTION      three total   • D & C HYSTEROSCOPY ENDOMETRIAL ABLATION N/A 2017    Procedure: DILATATION AND CURETTAGE HYSTEROSCOPY NOVASURE ENDOMETRIAL ABLATION;  Surgeon: Toshia Morrell DO;  Location: [facility];  Service:    • TUBAL ABDOMINAL LIGATION         Problem List:  Patient Active Problem List   Diagnosis   • Migraine without aura and without status migrainosus, not intractable   • Restless leg syndrome   • Attention deficit disorder of adult   • Mixed anxiety depressive disorder   • Factor V Leiden mutation (HCC)   • Non-toxic multinodular goiter   • Bilateral hearing loss       Allergy:   Allergies   Allergen Reactions   • Strattera [Atomoxetine Hcl] Urinary Retention        Current Medications:   Current Outpatient  Medications   Medication Sig Dispense Refill   • lisdexamfetamine (Vyvanse) 40 MG capsule Take 1 capsule by mouth Every Morning 30 capsule 0   • Progesterone (PROMETRIUM) 100 MG capsule Take 100 mg by mouth every night at bedtime.     • SUMAtriptan (IMITREX) 100 MG tablet Take 1 tablet by mouth Every 2 (Two) Hours As Needed for Migraine. 9 tablet 5     No current facility-administered medications for this visit.       Review of Systems:    Review of Systems   Psychiatric/Behavioral: Positive for stress.   All other systems reviewed and are negative.        Physical Exam:   Physical Exam  Vitals reviewed.   Constitutional:       Appearance: Normal appearance.   HENT:      Head: Normocephalic.   Neurological:      Mental Status: She is alert.   Psychiatric:         Attention and Perception: Attention and perception normal.         Mood and Affect: Mood and affect normal.         Speech: Speech normal.         Behavior: Behavior normal. Behavior is cooperative.         Thought Content: Thought content normal.         Cognition and Memory: Cognition and memory normal.         Judgment: Judgment normal.         Vitals:  not currently breastfeeding. There is no height or weight on file to calculate BMI.  Due to extenuating circumstances and possible current health risks associated with the patient being present in a clinical setting (with current health restrictions in place in regards to possible COVID 19 transmission/exposure), the patient was seen remotely today via a MyChart Video Visit through [facility] and telephone encounter.  Unable to obtain vital signs due to nature of remote visit.  Height stated at 70 inches.  Weight stated at 195 pounds.    Last 3 Blood Pressure Readings:  BP Readings from Last 3 Encounters:   10/21/22 132/80   08/19/22 128/86   07/19/18 116/74       PHQ-9 Score:   PHQ-9 Total Score:                   Mental Status Exam:   Hygiene:   good  Cooperation:  Cooperative  Eye Contact:  Good  Psychomotor  Behavior:  Appropriate  Affect:  Full range  Mood: normal  Hopelessness: Denies  Speech:  Normal  Thought Process:  Goal directed and Linear  Thought Content:  Normal  Suicidal:  None  Homicidal:  None  Hallucinations:  None  Delusion:  None  Memory:  Intact  Orientation:  Person, Place, Time and Situation  Reliability:  good  Insight:  Good  Judgement:  Good  Impulse Control:  Good  Physical/Medical Issues:  No        Lab Results:     Received  fax from [facility]. this was scanned to chart, urine drug screen was acceptable within normal limits.    No visits with results within 1 Month(s) from this visit.   Latest known visit with results is:   Results Encounter on 08/19/2022   Component Date Value Ref Range Status   • Cologuard 09/07/2022 Negative  Negative Final    Comment:   NEGATIVE TEST RESULT. A negative Cologuard result indicates a low likelihood that a colorectal cancer (CRC) or advanced adenoma (adenomatous polyps with more advanced pre-malignant features)  is present. The chance that a person with a negative Cologuard test has a colorectal cancer is less than 1 in 1500 (negative predictive value >99.9%) or has an  advanced adenoma is less than  5.3% (negative predictive value 94.7%). These data are based on a prospective cross-sectional study of 10,000 individuals at average risk for colorectal cancer who were screened with both Cologuard and colonoscopy. (Sina BELCHER. et al, N Engl J Med 2014;370(14):6166-9296) The normal value (reference range) for this assay is negative.    COLOGUARD RE-SCREENING RECOMMENDATION: Periodic colorectal cancer screening is an important part of preventive healthcare for asymptomatic individuals at average risk for colorectal cancer.  Following a negative Cologuard result, the American Cancer Society and U.S.                            Multi-Society Task Force screening guidelines recommend a Cologuard re-screening interval of 3 years.   References: American Cancer Society  Guideline for Colorectal Cancer Screening: https://www.cancer.org/cancer/colon-rectal-cancer/tggorifty-sapswxjev-ucjsvax/acs-recommendations.html.; Ney DK, Sonny CR, Malathi THAKURK, Colorectal Cancer Screening: Recommendations for Physicians and Patients from the U.S. Multi-Society Task Force on Colorectal Cancer Screening , Am J Gastroenterology 2017; 112:8119-6790.    TEST DESCRIPTION: Composite algorithmic analysis of stool DNA-biomarkers with hemoglobin immunoassay.   Quantitative values of individual biomarkers are not reportable and are not associated with individual biomarker result reference ranges. Cologuard is intended for colorectal cancer screening of adults of either sex, 45 years or older, who are at average-risk for colorectal cancer (CRC). Cologuard has been approved for use by the U.S. FDA. The performance of Cologuard was                            established in a cross sectional study of average-risk adults aged 50-84. Cologuard performance in patients ages 45 to 49 years was estimated by sub-group analysis of near-age groups. Colonoscopies performed for a positive result may find as the most clinically significant lesion: colorectal cancer [4.0%], advanced adenoma (including sessile serrated polyps greater than or equal to 1cm diameter) [20%] or non- advanced adenoma [31%]; or no colorectal neoplasia [45%]. These estimates are derived from a prospective cross-sectional screening study of 10,000 individuals at average risk for colorectal cancer who were screened with both Cologuard and colonoscopy. (Sina GOLD et al, N Engl J Med 2014;370(14):6250-4490.) Cologuard may produce a false negative or false positive result (no colorectal cancer or precancerous polyp present at colonoscopy follow up). A negative Cologuard test result does not guarantee the absence of CRC or advanced adenoma (pre-cancer). The current Cologuard                            screening interval is every 3 years. (American  "Cancer Society and U.S. Multi-Society Task Force). Cologuard performance data in a 10,000 patient pivotal study using colonoscopy as the reference method can be accessed at the following location: www.[employer]/results. Additional description of the Cologuard test process, warnings and precautions can be found at www.[employer].           Assessment & Plan   Diagnoses and all orders for this visit:    1. ADHD (attention deficit hyperactivity disorder), combined type  -     lisdexamfetamine (Vyvanse) 40 MG capsule; Take 1 capsule by mouth Every Morning  Dispense: 30 capsule; Refill: 0        Visit Diagnoses:    ICD-10-CM ICD-9-CM   1. ADHD (attention deficit hyperactivity disorder), combined type  F90.2 314.01         GOALS:  Short Term Goals: Patient will be compliant with medication, and patient will have no significant medication related side effects.  Patient will be engaged in psychotherapy as indicated.  Patient will report subjective improvement of symptoms.  Long term goals: To stabilize mood and treat/improve subjective symptoms, the patient will stay out of the hospital, the patient will be at an optimal level of functioning, and the patient will take all medications as prescribed.  The patient/guardian verbalized understanding and agreement with goals that were mutually set.    RISK ASSESSMENT  Current harm-to-self risk is reported by pt as \"none.\"  Current [id number] risk is reported by pt as \"none.\"   No suicidal thoughts, intent, plan is appreciated by this provider on this date of exam.   No homicidal thoughts, intent, plan is appreciated by this provider on this date.    TREATMENT PLAN: Continue supportive psychotherapy efforts and medications as indicated.  Pharmacological and Non-Pharmacological treatment options discussed during today's visit. Patient/Guardian acknowledged and verbally consented with current treatment plan and was educated on the importance of compliance with treatment " and follow-up appointments.      MEDICATION ISSUES:  Discussed medication options and treatment plan of prescribed medication as well as the risks, benefits, any black box warnings, and side effects including potential falls, possible impaired driving, and metabolic adversities among others. Patient is agreeable to call the office with any worsening of symptoms or onset of side effects, or if any concerns or questions arise.  The contact information for the office is made available to the patient. Patient is agreeable to call 911 or go to the nearest ER should they begin having any SI/HI, or if any urgent concerns arise. No medication side effects or related complaints today.     Continue Vyvanse 40mg QAM  Call provider 1 week prior to needing refills.  Patient wrote down phone number during session.  Also sent through patient instructions to my chart    MEDS ORDERED DURING VISIT:  New Medications Ordered This Visit   Medications   • lisdexamfetamine (Vyvanse) 40 MG capsule     Sig: Take 1 capsule by mouth Every Morning     Dispense:  30 capsule     Refill:  0     Fill when due       Follow Up Appointment:   Return in about 3 months (around 4/23/2023) for Recheck, Video visit.               This document has been electronically signed by YOMAIRA Hayden  January 23, 2023 17:10 EST    Dictated Utilizing Dragon Dictation: Part of this note may be an electronic transcription/translation of spoken language to printed text using the Dragon Dictation System.

## 2023-02-21 DIAGNOSIS — F90.2 ADHD (ATTENTION DEFICIT HYPERACTIVITY DISORDER), COMBINED TYPE: ICD-10-CM

## 2023-02-24 ENCOUNTER — OFFICE VISIT (OUTPATIENT)
Dept: FAMILY MEDICINE CLINIC | Facility: CLINIC | Age: 48
End: 2023-02-24
Payer: MEDICAID

## 2023-02-24 VITALS
HEIGHT: 70 IN | SYSTOLIC BLOOD PRESSURE: 118 MMHG | DIASTOLIC BLOOD PRESSURE: 74 MMHG | RESPIRATION RATE: 18 BRPM | BODY MASS INDEX: 25.2 KG/M2 | TEMPERATURE: 98.2 F | HEART RATE: 76 BPM | WEIGHT: 176 LBS

## 2023-02-24 DIAGNOSIS — E04.2 NON-TOXIC MULTINODULAR GOITER: ICD-10-CM

## 2023-02-24 DIAGNOSIS — E01.0 THYROMEGALY: Primary | ICD-10-CM

## 2023-02-24 PROCEDURE — 99214 OFFICE O/P EST MOD 30 MIN: CPT | Performed by: FAMILY MEDICINE

## 2023-02-24 NOTE — PROGRESS NOTES
Subjective   Pooja Whitehead is a 47 y.o. female.     History of Present Illness     The past couple weeks she has noted a pressure in her throat  Her throat just feels off and there is an external fullness she senses  Minor difficulty swallowing but this was longer than 2 weeks  She has a history of mulitnodular cystic thyroid  Has been a while since her last thyroid US  No dysphagia noted, nothing getting stuck    Has had some intermittent acid reflux  Has been taking OTC acid medicine without help        Review of Systems    Objective   Physical Exam  Vitals and nursing note reviewed.   Constitutional:       General: She is not in acute distress.     Appearance: Normal appearance. She is well-developed.   Neck:      Thyroid: Thyromegaly (more so on Right lobe) present.   Cardiovascular:      Rate and Rhythm: Normal rate and regular rhythm.      Heart sounds: Normal heart sounds.   Pulmonary:      Effort: Pulmonary effort is normal.      Breath sounds: Normal breath sounds.   Neurological:      Mental Status: She is alert and oriented to person, place, and time.   Psychiatric:         Mood and Affect: Mood normal.         Behavior: Behavior normal.         Thought Content: Thought content normal.         Judgment: Judgment normal.         Assessment & Plan   Diagnoses and all orders for this visit:    1. Thyromegaly (Primary)  -     US Thyroid; Future  -     TSH+Free T4  -     Thyroid Antibodies    2. Non-toxic multinodular goiter  -     US Thyroid; Future  -     TSH+Free T4  -     Thyroid Antibodies    US now and check labs.  Discussed that if there is an enlarged nodule that endocrinology and biopsy vs excision would be needed.  Will f/u pending results of labs and US

## 2023-02-27 LAB
T4 FREE SERPL-MCNC: 1.02 NG/DL (ref 0.82–1.77)
THYROGLOB AB SERPL-ACNC: <1 IU/ML (ref 0–0.9)
THYROPEROXIDASE AB SERPL-ACNC: <9 IU/ML (ref 0–34)
TSH SERPL DL<=0.005 MIU/L-ACNC: 0.45 UIU/ML (ref 0.45–4.5)

## 2023-03-16 ENCOUNTER — TELEPHONE (OUTPATIENT)
Dept: FAMILY MEDICINE CLINIC | Facility: CLINIC | Age: 48
End: 2023-03-16

## 2023-03-16 NOTE — TELEPHONE ENCOUNTER
Caller: Pooja Whitehead    Relationship to patient: Self    Best call back number: [phone]    Patient is needing: PATIENT IS WANTING TO KNOW AN UPDATE ON THE THYROID ULTRASOUND THAT WAS ORDERED FOR HER.     PATIENT STATES ITS BEEN A WEEK SINCE SHE CHECKED AND IT WAS ORDERED ON 2.24.23 AT HER LAST VISIT.     PATIENT STATES IF SHE NEEDS TO BE FAXED AN ORDER THAT IS FINE, SHE WOULD JUST LIKE TO KNOW WHAT THE STATUS IS.     PATIENT WOULD LIKE A CALL BACK AND A MESSAGE CAN BE LEFT IF NO ANSWER.

## 2023-03-17 NOTE — TELEPHONE ENCOUNTER
Spoke with pt and sent referral to  anya to be scheduled. Also provided pt with central scheduling phone number

## 2023-03-24 ENCOUNTER — HOSPITAL ENCOUNTER (OUTPATIENT)
Dept: ULTRASOUND IMAGING | Facility: HOSPITAL | Age: 48
Discharge: HOME | End: 2023-03-24
Admitting: FAMILY MEDICINE
Payer: MEDICAID

## 2023-03-24 DIAGNOSIS — E01.0 THYROMEGALY: ICD-10-CM

## 2023-03-24 DIAGNOSIS — E04.2 NON-TOXIC MULTINODULAR GOITER: ICD-10-CM

## 2023-03-24 DIAGNOSIS — F90.2 ADHD (ATTENTION DEFICIT HYPERACTIVITY DISORDER), COMBINED TYPE: ICD-10-CM

## 2023-03-24 PROCEDURE — 76536 US EXAM OF HEAD AND NECK: CPT

## 2023-03-26 DIAGNOSIS — E04.1 THYROID NODULE: Primary | ICD-10-CM

## 2023-03-31 ENCOUNTER — TELEPHONE (OUTPATIENT)
Dept: FAMILY MEDICINE CLINIC | Facility: CLINIC | Age: 48
End: 2023-03-31
Payer: MEDICAID

## 2023-03-31 NOTE — TELEPHONE ENCOUNTER
Pt phoned stated she wants Dr Gonzales to arrange for biopsy to [facility] completed-pt advised referral to ENT still pending in which that specialist may arrange for thi biopsy but patient doesn't want to wait

## 2023-04-10 ENCOUNTER — TELEMEDICINE (OUTPATIENT)
Dept: PSYCHIATRY | Facility: CLINIC | Age: 48
End: 2023-04-10
Payer: MEDICAID

## 2023-04-10 DIAGNOSIS — F90.2 ADHD (ATTENTION DEFICIT HYPERACTIVITY DISORDER), COMBINED TYPE: Primary | ICD-10-CM

## 2023-04-10 PROCEDURE — 1160F RVW MEDS BY RX/DR IN RCRD: CPT | Performed by: NURSE PRACTITIONER

## 2023-04-10 PROCEDURE — 99214 OFFICE O/P EST MOD 30 MIN: CPT | Performed by: NURSE PRACTITIONER

## 2023-04-10 PROCEDURE — 1159F MED LIST DOCD IN RCRD: CPT | Performed by: NURSE PRACTITIONER

## 2023-04-10 NOTE — PROGRESS NOTES
"This provider is located at [facility], [address] [place] [address], [facility], [id number] using a secure [email or web address] Video Visit through [employer]. Patient is being seen remotely via telehealth at their work address in [place], and stated they are in a secure environment for this session. The patient's condition being diagnosed/treated is appropriate for telemedicine. The provider identified herself as well as her credentials.   The patient, and/or patients guardian, consent to be seen remotely, and when consent is given they understand that the consent allows for patient identifiable information to be sent to a third party as needed.   They may refuse to be seen remotely at any time. The electronic data is encrypted and password protected, and the patient and/or guardian has been advised of the potential risks to privacy not withstanding such measures.   PT Identifiers used: Name and .    You have chosen to receive care through a telehealth visit.  Do you consent to use a video/audio connection for your medical care today? Yes      Subjective   Pooja Whitehead is a 47 y.o. female who presents today for follow up    Chief Complaint:  \" adhd\"     History of Present Illness:    History of Present Illness  Improvement in focus and concentration when taking the Vyvanse 40 mg on a daily basis.  However she feels like she is a little more \"revved up.\"  With a little bit of hand tremor when the medication is first breaking down in the first-pass, because it is a prodrug.  She reports after about an hour after she takes the medication this feeling goes away.  Reports sleep overall is good, she continues to be successful and completing a task, focus and concentration for her work.  Discussed reducing the dosage down to 30 mg daily and see if the side effect improves.              The following portions of the patient's history were reviewed and updated as appropriate: allergies, current medications, past family history, past medical " history, past social history, past surgical history and problem list.    Past Psychiatric History:  Began Treatment:Several years ago  Diagnoses:Diagnosed with ADHD as a child, not treated medically.  Diagnosed with depression and anxiety several years ago.   Psychiatrist:Denies  Therapist:Brief marriage counseling in the past with her first marriage she did not find this effective.  Admission History:Denies  Medication Trials:Recent trial of Pristiq, she was doing 50 mg every other day and seem to be doing okay on it.  Primary care wrote for 25 mg daily and she reports she just has side effects with the medication and stopped taking it.  Vyvanse was trialed many years ago was deemed to be successful, highest dose was 60 mg daily.  Trintellix samples gave her a lot of nausea she lost 5 pounds, also gave her urinary retention.  Strattera was a good medicine for the attention deficit but she had severe urinary retention with this and had to stop taking it for that reason.  Trial of methylphenidate in the form of Concerta was deemed ineffective.  Generic Adderall was not very effective, and the higher doses gave her anxiety.  Lexapro gave significant weight gain although it was helpful for her symptoms.  Trial of Effexor she does not remember the results.  Trial of Zoloft reviewed reportedly gave her a lot of sedation even at the lowest dose of 25 mg.  She was on Wellbutrin for an extended length of time in the past.  Self Harm: Denies  Suicide Attempts:Denies   Psychosis, Anxiety, Depression: Some brief mood changes after each pregnancy.    Past Medical History:  Past Medical History:   Diagnosis Date   • ADHD (attention deficit hyperactivity disorder)    • Anxiety    • Disease of thyroid gland     Multinodular goitor   • Factor 5 Leiden mutation, heterozygous    • Headache        Substance Abuse History:   Types:Denies all, including illicit      Social History:  Social History     Socioeconomic History   •  Marital status:    • Number of children: 4   • Highest education level: Master's degree (e.g., MA, MS, Rafia, MEd, MSW, GAVI)   Tobacco Use   • Smoking status: Never   • Smokeless tobacco: Never   Substance and Sexual Activity   • Alcohol use: No   • Drug use: No   • Sexual activity: Yes     Birth control/protection: Surgical     Comment: TUBAL    Patient owns her own business.  Denies any history of  service, denies any legal problems.  Spouse is very supportive.  Endorses [employer].  Adopted her sister's male child at the child's birth.    Family History:  Family History   Problem Relation Age of Onset   • Alcohol abuse Mother    • Hypertension Mother    • Hyperlipidemia Mother    • Aortic aneurysm Mother    • Parkinsonism Father    • Ki Parkinson White syndrome Father    • Hemochromatosis Father    • OCD Sister    • Breast cancer Paternal Aunt         DX AGE 40'S   • Breast cancer Paternal Aunt         DX AGE 40'S   • Breast cancer Paternal Grandmother         DX AGE POST MENOPAUSAL   • ADD / ADHD Son    • Ovarian cancer Neg Hx        Past Surgical History:  Past Surgical History:   Procedure Laterality Date   • AUGMENTATION MAMMAPLASTY Bilateral     SILICONE , retropectoral   •  SECTION      three total   • D & C HYSTEROSCOPY ENDOMETRIAL ABLATION N/A 2017    Procedure: DILATATION AND CURETTAGE HYSTEROSCOPY NOVASURE ENDOMETRIAL ABLATION;  Surgeon: Toshia Morrell DO;  [facility]: [facility];  Service:    • TUBAL ABDOMINAL LIGATION         Problem List:  Patient Active Problem List   Diagnosis   • Migraine without aura and without status migrainosus, not intractable   • Restless leg syndrome   • Attention deficit disorder of adult   • Mixed anxiety depressive disorder   • Factor V Leiden mutation   • Non-toxic multinodular goiter   • Bilateral hearing loss       Allergy:   Allergies   Allergen Reactions   • Strattera [Atomoxetine Hcl] Urinary Retention        Current  Medications:   Current Outpatient Medications   Medication Sig Dispense Refill   • Progesterone (PROMETRIUM) 100 MG capsule Take 1 capsule by mouth every night at bedtime.     • SUMAtriptan (IMITREX) 100 MG tablet Take 1 tablet by mouth Every 2 (Two) Hours As Needed for Migraine. 9 tablet 5   • lisdexamfetamine (Vyvanse) 30 MG capsule Take 1 capsule by mouth Every Morning 30 capsule 0     No current facility-administered medications for this visit.       Review of Systems:    Review of Systems   Psychiatric/Behavioral: Positive for stress.   All other systems reviewed and are negative.        Physical Exam:   Physical Exam  Vitals reviewed.   Constitutional:       Appearance: Normal appearance.   HENT:      Head: Normocephalic.   Neurological:      Mental Status: She is alert.   Psychiatric:         Attention and Perception: Attention and perception normal.         Mood and Affect: Mood and affect normal.         Speech: Speech normal.         Behavior: Behavior normal. Behavior is cooperative.         Thought Content: Thought content normal.         Cognition and Memory: Cognition and memory normal.         Judgment: Judgment normal.         Vitals:  not currently breastfeeding. There is no height or weight on file to calculate BMI.  Due to extenuating circumstances and possible current health risks associated with the patient being present in a clinical setting (with current health restrictions in place in regards to possible COVID 19 transmission/exposure), the patient was seen remotely today via a MyChart Video Visit through [facility] and telephone encounter.  Unable to obtain vital signs due to nature of remote visit.  Height stated at 70 inches.  Weight stated at 195 pounds.    Last 3 Blood Pressure Readings:  BP Readings from Last 3 Encounters:   02/24/23 118/74   10/21/22 132/80   08/19/22 128/86       PHQ-9 Depression Screening  Little interest or pleasure in doing things? (P) 0-->not at all   Feeling down,  depressed, or hopeless? (P) 0-->not at all   Trouble falling or staying asleep, or sleeping too much? (P) 0-->not at all   Feeling tired or having little energy? (P) 0-->not at all   Poor appetite or overeating? (P) 0-->not at all   Feeling bad about yourself - or that you are a failure or have let yourself or your family down? (P) 0-->not at all   Trouble concentrating on things, such as reading the newspaper or watching television? (P) 0-->not at all   Moving or speaking so slowly that other people could have noticed? Or the opposite - being so fidgety or restless that you have been moving around a lot more than usual? (P) 0-->not at all   Thoughts that you would be better off dead, or of hurting yourself in some way? (P) 0-->not at all   PHQ-9 Total Score (P) 0   If you checked off any problems, how difficult have these problems made it for you to do your work, take care of things at home, or get along with other people?         Feeling nervous, anxious or on edge: (P) Not at all  Not being able to stop or control worrying: (P) Not at all  Worrying too much about different things: (P) Not at all  Trouble Relaxing: (P) Not at all  Being so restless that it is hard to sit still: (P) Not at all  Feeling afraid as if something awful might happen: (P) Not at all  Becoming easily annoyed or irritable: (P) Not at all  SHELLI 7 Total Score: (P) 0     Mental Status Exam:   Hygiene:   good  Cooperation:  Cooperative  Eye Contact:  Good  Psychomotor Behavior:  Appropriate  Affect:  Full range  Mood: normal  Hopelessness: Denies  Speech:  Normal  Thought Process:  Goal directed and Linear  Thought Content:  Normal  Suicidal:  None  Homicidal:  None  Hallucinations:  None  Delusion:  None  Memory:  Intact  Orientation:  Person, Place, Time and Situation  Reliability:  good  Insight:  Good  Judgement:  Good  Impulse Control:  Good  Physical/Medical Issues:  No        Lab Results:        No visits with results within 1 Month(s)  "from this visit.   Latest known visit with results is:   Office Visit on 02/24/2023   Component Date Value Ref Range Status   • TSH 02/24/2023 0.454  0.450 - 4.500 uIU/mL Final   • Free T4 02/24/2023 1.02  0.82 - 1.77 ng/dL Final   • Thyroid Peroxidase Antibody 02/24/2023 <9  0 - 34 IU/mL Final   • Thyroglobulin Ab 02/24/2023 <1.0  0.0 - 0.9 IU/mL Final    Thyroglobulin Antibody measured by [facility] Method[facility]         Assessment & Plan   Diagnoses and all orders for this visit:    1. ADHD (attention deficit hyperactivity disorder), combined type (Primary)  -     lisdexamfetamine (Vyvanse) 30 MG capsule; Take 1 capsule by mouth Every Morning  Dispense: 30 capsule; Refill: 0    Other orders  -     SCANNED - LABS        Visit Diagnoses:    ICD-10-CM ICD-9-CM   1. ADHD (attention deficit hyperactivity disorder), combined type  F90.2 314.01         GOALS:  Short Term Goals: Patient will be compliant with medication, and patient will have no significant medication related side effects.  Patient will be engaged in psychotherapy as indicated.  Patient will report subjective improvement of symptoms.  Long term goals: To stabilize mood and treat/improve subjective symptoms, the patient will stay out of the hospital, the patient will be at an optimal level of functioning, and the patient will take all medications as prescribed.  The patient/guardian verbalized understanding and agreement with goals that were mutually set.    RISK ASSESSMENT  Current harm-to-self risk is reported by pt as \"none.\"  Current [id number] risk is reported by pt as \"none.\"   No suicidal thoughts, intent, plan is appreciated by this provider on this date of exam.   No homicidal thoughts, intent, plan is appreciated by this provider on this date.    TREATMENT PLAN: Continue supportive psychotherapy efforts and medications as indicated.  Pharmacological and Non-Pharmacological treatment options discussed during today's visit. Patient/Guardian " acknowledged and verbally consented with current treatment plan and was educated on the importance of compliance with treatment and follow-up appointments.      MEDICATION ISSUES:  Discussed medication options and treatment plan of prescribed medication as well as the risks, benefits, any black box warnings, and side effects including potential falls, possible impaired driving, and metabolic adversities among others. Patient is agreeable to call the office with any worsening of symptoms or onset of side effects, or if any concerns or questions arise.  The contact information for the office is made available to the patient. Patient is agreeable to call 911 or go to the nearest ER should they begin having any SI/HI, or if any urgent concerns arise.      Reduced dosage of Vyvanse to 30 mg every morning.  Monitor for side effects    MEDS ORDERED DURING VISIT:  New Medications Ordered This Visit   Medications   • lisdexamfetamine (Vyvanse) 30 MG capsule     Sig: Take 1 capsule by mouth Every Morning     Dispense:  30 capsule     Refill:  0     Dosage change       Follow Up Appointment:   Return in about 4 weeks (around 5/8/2023) for Recheck, Video visit.       Discussed with the patient that the Biden Administration announced on January 30, 2023 that the national and public health emergencies (PHE) will end on May 11, 2023.  Discussed with the patient that during the PHE a portion of the Jose Jonn Act was waived, allowing controlled substances to be provided via telemedicine without in person encounters.  Discussed with the patient that with the PHE ending the [place] Jonn Act will go back into full effect, meaning that prescriptions for controlled substances can not be given via telemedicine without in person encounters and meeting all requirements of the Jose Jonn Act.  Discussed with the patient that the [facility] is strictly a telemedicine clinic and cannot offer the  patient an in-person evaluation to be compliant with the Jose Jonn Act as it goes back into effect.  Discussed with the patient unless there are legislative changes prior to the ending of the PHE on May 11, 2023, the patient will no longer be able to obtain prescription(s) for controlled substance from this APRN/the [facility] and they will need to establish care with a local provider in their area for an in-person evaluation and treatment with any controlled substances.  The patient verbalizes understanding in their own words.          This document has been electronically signed by YOMAIRA Hayden  April 10, 2023 10:03 EDT    Dictated Utilizing Dragon Dictation: Part of this note may be an electronic transcription/translation of spoken language to printed text using the Dragon Dictation System.

## 2023-05-08 ENCOUNTER — TELEMEDICINE (OUTPATIENT)
Dept: PSYCHIATRY | Facility: CLINIC | Age: 48
End: 2023-05-08
Payer: MEDICAID

## 2023-05-08 DIAGNOSIS — F90.2 ADHD (ATTENTION DEFICIT HYPERACTIVITY DISORDER), COMBINED TYPE: Primary | ICD-10-CM

## 2023-05-08 NOTE — PROGRESS NOTES
"This provider is located at [facility], [address] [place] [address], [facility], [address] using a secure MyChart Video Visit through [employer]. Patient is being seen remotely via telehealth at their work address in [place], and stated they are in a secure environment for this session. The patient's condition being diagnosed/treated is appropriate for telemedicine. The provider identified herself as well as her credentials.   The patient, and/or patients guardian, consent to be seen remotely, and when consent is given they understand that the consent allows for patient identifiable information to be sent to a third party as needed.   They may refuse to be seen remotely at any time. The electronic data is encrypted and password protected, and the patient and/or guardian has been advised of the potential risks to privacy not withstanding such measures.   PT Identifiers used: Name and .    You have chosen to receive care through a telehealth visit.  Do you consent to use a video/audio connection for your medical care today? Yes      Subjective   Pooja Whitehead is a 47 y.o. female who presents today for follow up    Chief Complaint:  \" adhd\"     History of Present Illness:    History of Present Illness  Patient reported the 30 mg of Vyvanse was not as effective in helping focus and concentration, reports \"attention is all over the place.\"  However the initial feeling of being a little more \"revved up\" is improved with the lower dose.  Again after about an hour this feeling goes away.  Medication is a prodrug, suspect first-pass metabolism creating this side effect.  Patient reports she has no problems with sleep, appetite is good would like to go back to the 40 mg daily.  I did also discuss recent development with the [employer] regulations, that the final ruling will be this week but all indications is that the controlled substance waiver on the [place] act will stay in place at this time.       The following portions of the " patient's history were reviewed and updated as appropriate: allergies, current medications, past family history, past medical history, past social history, past surgical history and problem list.    Past Psychiatric History:  Began Treatment:Several years ago  Diagnoses:Diagnosed with ADHD as a child, not treated medically.  Diagnosed with depression and anxiety several years ago.   Psychiatrist:Denies  Therapist:Brief marriage counseling in the past with her first marriage she did not find this effective.  Admission History:Denies  Medication Trials:Recent trial of Pristiq, she was doing 50 mg every other day and seem to be doing okay on it.  Primary care wrote for 25 mg daily and she reports she just has side effects with the medication and stopped taking it. Qelbree samples for 2 weeks was deemed ineffective. Vyvanse was trialed many years ago was deemed to be successful, highest dose was 60 mg daily.  Trintellix samples gave her a lot of nausea she lost 5 pounds, also gave her urinary retention.  Strattera was a good medicine for the attention deficit but she had severe urinary retention with this and had to stop taking it for that reason.  Trial of methylphenidate in the form of Concerta was deemed ineffective.  Generic Adderall was not very effective, and the higher doses gave her anxiety.  Lexapro gave significant weight gain although it was helpful for her symptoms.  Trial of Effexor she does not remember the results.  Trial of Zoloft reviewed reportedly gave her a lot of sedation even at the lowest dose of 25 mg.  She was on Wellbutrin for an extended length of time in the past.  Self Harm: Denies  Suicide Attempts:Denies   Psychosis, Anxiety, Depression: Some brief mood changes after each pregnancy.    Past Medical History:  Past Medical History:   Diagnosis Date   • ADHD (attention deficit hyperactivity disorder)    • Anxiety    • Disease of thyroid gland     Multinodular goitor   • Factor 5  Leiden mutation, heterozygous    • Headache        Substance Abuse History:   Types:Denies all, including illicit      Social History:  Social History     Socioeconomic History   • Marital status:    • Number of children: 4   • Highest education level: Master's degree (e.g., MA, MS, Rafia, MEd, MSW, GAVI)   Tobacco Use   • Smoking status: Never   • Smokeless tobacco: Never   Substance and Sexual Activity   • Alcohol use: No   • Drug use: No   • Sexual activity: Yes     Birth control/protection: Surgical     Comment: TUBAL    Patient owns her own business.  Denies any history of  service, denies any legal problems.  Spouse is very supportive.  Endorses Jain elisha.  Adopted her sister's male child at the child's birth.    Family History:  Family History   Problem Relation Age of Onset   • Alcohol abuse Mother    • Hypertension Mother    • Hyperlipidemia Mother    • Aortic aneurysm Mother    • Parkinsonism Father    • Ki Parkinson White syndrome Father    • Hemochromatosis Father    • OCD Sister    • Breast cancer Paternal Aunt         DX AGE 40'S   • Breast cancer Paternal Aunt         DX AGE 40'S   • Breast cancer Paternal Grandmother         DX AGE POST MENOPAUSAL   • ADD / ADHD Son    • Ovarian cancer Neg Hx        Past Surgical History:  Past Surgical History:   Procedure Laterality Date   • AUGMENTATION MAMMAPLASTY Bilateral     SILICONE , retropectoral   •  SECTION      three total   • D & C HYSTEROSCOPY ENDOMETRIAL ABLATION N/A 2017    Procedure: DILATATION AND CURETTAGE HYSTEROSCOPY NOVASURE ENDOMETRIAL ABLATION;  Surgeon: Toshia Morrell DO;  Location: [facility] OR;  Service:    • TUBAL ABDOMINAL LIGATION         Problem List:  Patient Active Problem List   Diagnosis   • Migraine without aura and without status migrainosus, not intractable   • Restless leg syndrome   • Attention deficit disorder of adult   • Mixed anxiety depressive disorder   • Factor V Leiden mutation   •  Non-toxic multinodular goiter   • Bilateral hearing loss       Allergy:   Allergies   Allergen Reactions   • Strattera [Atomoxetine Hcl] Urinary Retention        Current Medications:   Current Outpatient Medications   Medication Sig Dispense Refill   • Progesterone (PROMETRIUM) 100 MG capsule Take 1 capsule by mouth every night at bedtime.     • SUMAtriptan (IMITREX) 100 MG tablet Take 1 tablet by mouth Every 2 (Two) Hours As Needed for Migraine. 9 tablet 5   • lisdexamfetamine (Vyvanse) 40 MG capsule Take 1 capsule by mouth Every Morning 30 capsule 0     No current facility-administered medications for this visit.       Review of Systems:    Review of Systems   Psychiatric/Behavioral: Positive for decreased concentration and stress.   All other systems reviewed and are negative.        Physical Exam:   Physical Exam  Vitals reviewed.   Constitutional:       Appearance: Normal appearance.   HENT:      Head: Normocephalic.   Neurological:      Mental Status: She is alert.   Psychiatric:         Attention and Perception: Attention and perception normal.         Mood and Affect: Mood and affect normal.         Speech: Speech normal.         Behavior: Behavior normal. Behavior is cooperative.         Thought Content: Thought content normal.         Cognition and Memory: Cognition and memory normal.         Judgment: Judgment normal.         Vitals:  not currently breastfeeding. There is no height or weight on file to calculate BMI.  Due to extenuating circumstances and possible current health risks associated with the patient being present in a clinical setting (with current health restrictions in place in regards to possible COVID 19 transmission/exposure), the patient was seen remotely today via a MyChart Video Visit through [facility] and telephone encounter.  Unable to obtain vital signs due to nature of remote visit.  Height stated at 70 inches.  Weight stated at 195 pounds.    Last 3 Blood Pressure Readings:  BP  Readings from Last 3 Encounters:   02/24/23 118/74   10/21/22 132/80   08/19/22 128/86       Mental Status Exam:   Hygiene:   good  Cooperation:  Cooperative  Eye Contact:  Good  Psychomotor Behavior:  Appropriate  Affect:  Full range  Mood: normal  Hopelessness: Denies  Speech:  Normal  Thought Process:  Goal directed and Linear  Thought Content:  Normal  Suicidal:  None  Homicidal:  None  Hallucinations:  None  Delusion:  None  Memory:  Intact  Orientation:  Person, Place, Time and Situation  Reliability:  good  Insight:  Good  Judgement:  Good  Impulse Control:  Good  Physical/Medical Issues:  No        Lab Results:        No visits with results within 1 Month(s) from this visit.   Latest known visit with results is:   Office Visit on 02/24/2023   Component Date Value Ref Range Status   • TSH 02/24/2023 0.454  0.450 - 4.500 uIU/mL Final   • Free T4 02/24/2023 1.02  0.82 - 1.77 ng/dL Final   • Thyroid Peroxidase Antibody 02/24/2023 <9  0 - 34 IU/mL Final   • Thyroglobulin Ab 02/24/2023 <1.0  0.0 - 0.9 IU/mL Final    Thyroglobulin Antibody measured by [employer] Methodology         Assessment & Plan   Diagnoses and all orders for this visit:    1. ADHD (attention deficit hyperactivity disorder), combined type (Primary)  -     lisdexamfetamine (Vyvanse) 40 MG capsule; Take 1 capsule by mouth Every Morning  Dispense: 30 capsule; Refill: 0        Visit Diagnoses:    ICD-10-CM ICD-9-CM   1. ADHD (attention deficit hyperactivity disorder), combined type  F90.2 314.01         GOALS:  Short Term Goals: Patient will be compliant with medication, and patient will have no significant medication related side effects.  Patient will be engaged in psychotherapy as indicated.  Patient will report subjective improvement of symptoms.  Long term goals: To stabilize mood and treat/improve subjective symptoms, the patient will stay out of the hospital, the patient will be at an optimal level of functioning, and the patient will  "take all medications as prescribed.  The patient/guardian verbalized understanding and agreement with goals that were mutually set.    RISK ASSESSMENT  Current harm-to-self risk is reported by pt as \"none.\"  Current pken-fu-fqfhly risk is reported by pt as \"none.\"   No suicidal thoughts, intent, plan is appreciated by this provider on this date of exam.   No homicidal thoughts, intent, plan is appreciated by this provider on this date.    TREATMENT PLAN: Continue supportive psychotherapy efforts and medications as indicated.  Pharmacological and Non-Pharmacological treatment options discussed during today's visit. Patient/Guardian acknowledged and verbally consented with current treatment plan and was educated on the importance of compliance with treatment and follow-up appointments.      MEDICATION ISSUES:  Discussed medication options and treatment plan of prescribed medication as well as the risks, benefits, any black box warnings, and side effects including potential falls, possible impaired driving, and metabolic adversities among others. Patient is agreeable to call the office with any worsening of symptoms or onset of side effects, or if any concerns or questions arise.  The contact information for the office is made available to the patient. Patient is agreeable to call 911 or go to the nearest ER should they begin having any SI/HI, or if any urgent concerns arise.      Return to the 40mg dosage of Vyvanse  Monitor for side effects  Call one week prior to needing refill     MEDS ORDERED DURING VISIT:  New Medications Ordered This Visit   Medications   • lisdexamfetamine (Vyvanse) 40 MG capsule     Sig: Take 1 capsule by mouth Every Morning     Dispense:  30 capsule     Refill:  0     Dosage adjustment       Follow Up Appointment:   Return in about 8 weeks (around 7/6/2023) for Recheck, Video visit.                  This document has been electronically signed by YOMAIRA Hayden  May 8, 2023 10:02 " EDT    Dictated Utilizing Dragon Dictation: Part of this note may be an electronic transcription/translation of spoken language to printed text using the Dragon Dictation System.

## 2023-05-08 NOTE — PATIENT INSTRUCTIONS
For concerns or needing assistance call the [facility] at [phone]  Call one week prior to needing refill  Return to the 40mg dosage of Vyvanse  Monitor for side effects  Follow up appt 0495 EDT July 6

## 2023-05-31 ENCOUNTER — TRANSCRIBE ORDERS (OUTPATIENT)
Dept: ADMINISTRATIVE | Facility: HOSPITAL | Age: 48
End: 2023-05-31

## 2023-05-31 DIAGNOSIS — K40.90 UNILATERAL INGUINAL HERNIA WITHOUT OBSTRUCTION OR GANGRENE, RECURRENCE NOT SPECIFIED: Primary | ICD-10-CM

## 2023-06-05 ENCOUNTER — HOSPITAL ENCOUNTER (OUTPATIENT)
Dept: ULTRASOUND IMAGING | Facility: HOSPITAL | Age: 48
Discharge: HOME | End: 2023-06-05
Admitting: SURGERY
Payer: MEDICAID

## 2023-06-05 DIAGNOSIS — K40.90 UNILATERAL INGUINAL HERNIA WITHOUT OBSTRUCTION OR GANGRENE, RECURRENCE NOT SPECIFIED: ICD-10-CM

## 2023-06-05 PROCEDURE — 76857 US EXAM PELVIC LIMITED: CPT

## 2023-06-12 DIAGNOSIS — F90.2 ADHD (ATTENTION DEFICIT HYPERACTIVITY DISORDER), COMBINED TYPE: ICD-10-CM

## 2023-08-16 DIAGNOSIS — F90.2 ADHD (ATTENTION DEFICIT HYPERACTIVITY DISORDER), COMBINED TYPE: ICD-10-CM

## 2023-09-20 DIAGNOSIS — F90.2 ADHD (ATTENTION DEFICIT HYPERACTIVITY DISORDER), COMBINED TYPE: ICD-10-CM

## 2023-10-03 ENCOUNTER — TELEMEDICINE (OUTPATIENT)
Dept: PSYCHIATRY | Facility: CLINIC | Age: 48
End: 2023-10-03
Payer: MEDICAID

## 2023-10-03 DIAGNOSIS — F90.2 ADHD (ATTENTION DEFICIT HYPERACTIVITY DISORDER), COMBINED TYPE: Primary | ICD-10-CM

## 2023-10-03 DIAGNOSIS — Z79.899 ENCOUNTER FOR LONG-TERM (CURRENT) USE OF OTHER MEDICATIONS: ICD-10-CM

## 2023-10-03 NOTE — PROGRESS NOTES
"This provider is located at [facility], [address] [place] [address], [facility], [address] using a secure [employer] Video Visit through [employer]. Patient is being seen remotely via telehealth at their work address in [place], and stated they are in a secure environment for this session. The patient's condition being diagnosed/treated is appropriate for telemedicine. The provider identified herself as well as her credentials.   The patient, and/or patients guardian, consent to be seen remotely, and when consent is given they understand that the consent allows for patient identifiable information to be sent to a third party as needed.   They may refuse to be seen remotely at any time. The electronic data is encrypted and password protected, and the patient and/or guardian has been advised of the potential risks to privacy not withstanding such measures.   PT Identifiers used: Name and .    You have chosen to receive care through a telehealth visit.  Do you consent to use a video/audio connection for your medical care today? Yes      Subjective   Pooja Whitehead is a 47 y.o. female who presents today for follow up    Chief Complaint:  \" adhd\"     History of Present Illness:    History of Present Illness  Continues with the Vyvanse at 40 mg every morning.  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        The following portions of the patient's history were reviewed and updated as appropriate: allergies, current medications, past family history, past medical history, past social history, past surgical history and problem list.    Past Psychiatric History:  Began Treatment: Several years ago  Diagnoses: Diagnosed with ADHD as a child, not treated medically.  Diagnosed with depression and anxiety " several years ago.   Psychiatrist:Denies  Therapist: Brief marriage counseling in the past with her first marriage she did not find this effective.  Admission History:Denies  Medication Trials: Recent trial of Pristiq, she was doing 50 mg every other day and seem to be doing okay on it.  Primary care wrote for 25 mg daily and she reports she just has side effects with the medication and stopped taking it. Qelbree samples for 2 weeks was deemed ineffective. Vyvanse was trialed many years ago was deemed to be successful, highest dose was 60 mg daily.  Trintellix samples gave her a lot of nausea she lost 5 pounds, also gave her urinary retention.  Strattera was a good medicine for the attention deficit but she had severe urinary retention with this and had to stop taking it for that reason.  Trial of methylphenidate in the form of Concerta was deemed ineffective.  Generic Adderall was not very effective, and the higher doses gave her anxiety.  Lexapro gave significant weight gain although it was helpful for her symptoms.  Trial of Effexor she does not remember the results.  Trial of Zoloft reviewed reportedly gave her a lot of sedation even at the lowest dose of 25 mg.  She was on Wellbutrin for an extended length of time in the past.  Self Harm: Denies  Suicide Attempts:Denies   Psychosis, Anxiety, Depression:  Some brief mood changes after each pregnancy.    Past Medical History:  Past Medical History:   Diagnosis Date    ADHD (attention deficit hyperactivity disorder)     Anxiety     Disease of thyroid gland     Multinodular goitor    Factor 5 Leiden mutation, heterozygous     Headache        Substance Abuse History:   Types:Denies all, including illicit      Social History:  Social History     Socioeconomic History    Marital status:     Number of children: 4    Highest education level: Master's degree (e.g., MA, MS, Rafia, MEd, MSW, GAVI)   Tobacco Use    Smoking status: Never    Smokeless tobacco:  Never   Substance and Sexual Activity    Alcohol use: No    Drug use: No    Sexual activity: Yes     Birth control/protection: Surgical     Comment: TUBAL    Patient owns her own business.  Denies any history of  service, denies any legal problems.  Spouse is very supportive.  Endorses Che[employer] elisha.  Adopted her sister's male child at the child's birth.    Family History:  Family History   Problem Relation Age of Onset    Alcohol abuse Mother     Hypertension Mother     Hyperlipidemia Mother     Aortic aneurysm Mother     Parkinsonism Father     Ki Parkinson White syndrome Father     Hemochromatosis Father     OCD Sister     Breast cancer Paternal Aunt         DX AGE 40'S    Breast cancer Paternal Aunt         DX AGE 40'S    Breast cancer Paternal Grandmother         DX AGE POST MENOPAUSAL    ADD / ADHD Son     Ovarian cancer Neg Hx        Past Surgical History:  Past Surgical History:   Procedure Laterality Date    AUGMENTATION MAMMAPLASTY Bilateral     SILICONE , retropectoral     SECTION      three total    D & C HYSTEROSCOPY ENDOMETRIAL ABLATION N/A 2017    Procedure: DILATATION AND CURETTAGE HYSTEROSCOPY NOVASURE ENDOMETRIAL ABLATION;  Surgeon: Toshia Morrell DO;  Location: [facility] OR;  Service:     TUBAL ABDOMINAL LIGATION         Problem List:  Patient Active Problem List   Diagnosis    Migraine without aura and without status migrainosus, not intractable    Restless leg syndrome    Attention deficit disorder of adult    Mixed anxiety depressive disorder    Factor V Leiden mutation    Non-toxic multinodular goiter    Bilateral hearing loss       Allergy:   Allergies   Allergen Reactions    Strattera [Atomoxetine Hcl] Urinary Retention        Current Medications:   Current Outpatient Medications   Medication Sig Dispense Refill    lisdexamfetamine (Vyvanse) 40 MG capsule Take 1 capsule by mouth Every Morning 30 capsule 0    Progesterone (PROMETRIUM) 100 MG capsule Take 1 capsule  by mouth every night at bedtime.      SUMAtriptan (IMITREX) 100 MG tablet Take 1 tablet by mouth Every 2 (Two) Hours As Needed for Migraine. 9 tablet 5     No current facility-administered medications for this visit.       Review of Systems:    Review of Systems   Psychiatric/Behavioral:  Positive for decreased concentration and stress.    All other systems reviewed and are negative.      Physical Exam:   Physical Exam  Vitals reviewed.   Constitutional:       Appearance: Normal appearance.   HENT:      Head: Normocephalic.   Neurological:      Mental Status: She is alert.   Psychiatric:         Attention and Perception: Attention and perception normal.         Mood and Affect: Mood and affect normal.         Speech: Speech normal.         Behavior: Behavior normal. Behavior is cooperative.         Thought Content: Thought content normal.         Cognition and Memory: Cognition and memory normal.         Judgment: Judgment normal.       Vitals:  not currently breastfeeding. There is no height or weight on file to calculate BMI.  Due to extenuating circumstances and possible current health risks associated with the patient being present in a clinical setting (with current health restrictions in place in regards to possible COVID 19 transmission/exposure), the patient was seen remotely today via a MyChart Video Visit through [facility] and telephone encounter.  Unable to obtain vital signs due to nature of remote visit.  Height stated at 70 inches.  Weight stated at 168 pounds.    Last 3 Blood Pressure Readings:  BP Readings from Last 3 Encounters:   02/24/23 118/74   10/21/22 132/80   08/19/22 128/86       Mental Status Exam:   Hygiene:   good  Cooperation:  Cooperative  Eye Contact:  Good  Psychomotor Behavior:  Appropriate  Affect:  Full range  Mood: normal  Hopelessness: Denies  Speech:  Normal  Thought Process:  Goal directed and Linear  Thought Content:  Normal  Suicidal:  None  Homicidal:  None  Hallucinations:   "None  Delusion:  None  Memory:  Intact  Orientation:  Person, Place, Time and Situation  Reliability:  good  Insight:  Good  Judgement:  Good  Impulse Control:  Good  Physical/Medical Issues:  No        Lab Results:        No visits with results within 1 Month(s) from this visit.   Latest known visit with results is:   Office Visit on 02/24/2023   Component Date Value Ref Range Status    TSH 02/24/2023 0.454  0.450 - 4.500 uIU/mL Final    Free T4 02/24/2023 1.02  0.82 - 1.77 ng/dL Final    Thyroid Peroxidase Antibody 02/24/2023 <9  0 - 34 IU/mL Final    Thyroglobulin Ab 02/24/2023 <1.0  0.0 - 0.9 IU/mL Final    Thyroglobulin Antibody measured by [employer] Methodology         Assessment & Plan   Diagnoses and all orders for this visit:    1. ADHD (attention deficit hyperactivity disorder), combined type (Primary)    2. Encounter for long-term (current) use of other medications  -     Urine Drug Screen - Urine, Clean Catch          Visit Diagnoses:    ICD-10-CM ICD-9-CM   1. ADHD (attention deficit hyperactivity disorder), combined type  F90.2 314.01   2. Encounter for long-term (current) use of other medications  Z79.899 V58.69           GOALS:  Short Term Goals: Patient will be compliant with medication, and patient will have no significant medication related side effects.  Patient will be engaged in psychotherapy as indicated.  Patient will report subjective improvement of symptoms.  Long term goals: To stabilize mood and treat/improve subjective symptoms, the patient will stay out of the hospital, the patient will be at an optimal level of functioning, and the patient will take all medications as prescribed.  The patient/guardian verbalized understanding and agreement with goals that were mutually set.    RISK ASSESSMENT  Current harm-to-self risk is reported by pt as \"none.\"  Current uioz-ue-wrvwet risk is reported by pt as \"none.\"   No suicidal thoughts, intent, plan is appreciated by this provider on this " date of exam.   No homicidal thoughts, intent, plan is appreciated by this provider on this date.    TREATMENT PLAN: Continue supportive psychotherapy efforts and medications as indicated.  Pharmacological and Non-Pharmacological treatment options discussed during today's visit. Patient/Guardian acknowledged and verbally consented with current treatment plan and was educated on the importance of compliance with treatment and follow-up appointments.      MEDICATION ISSUES:  Discussed medication options and treatment plan of prescribed medication as well as the risks, benefits, any black box warnings, and side effects including potential falls, possible impaired driving, and metabolic adversities among others. Patient is agreeable to call the office with any worsening of symptoms or onset of side effects, or if any concerns or questions arise.  The contact information for the office is made available to the patient. Patient is agreeable to call 911 or go to the nearest ER should they begin having any SI/HI, or if any urgent concerns arise.      Continue Vyvanse at 40 mg every morning  Call one week prior to needing refill   Electronic controlled substance agreement again discussed and sent to patient's [facility].  UDS ordered per policy    MEDS ORDERED DURING VISIT:  No orders of the defined types were placed in this encounter.  No prescription needed today recently filled    Follow Up Appointment:   Return in about 13 weeks (around 1/2/2024) for Recheck, Video visit.                  This document has been electronically signed by YOMAIRA Hayden  October 3, 2023 15:16 EDT    Dictated Utilizing Dragon Dictation: Part of this note may be an electronic transcription/translation of spoken language to printed text using the Dragon Dictation System.

## 2023-10-18 DIAGNOSIS — F90.2 ADHD (ATTENTION DEFICIT HYPERACTIVITY DISORDER), COMBINED TYPE: ICD-10-CM

## 2023-10-18 RX ORDER — LISDEXAMFETAMINE DIMESYLATE CAPSULES 40 MG/1
40 CAPSULE ORAL EVERY MORNING
Qty: 30 CAPSULE | Refills: 0 | Status: SHIPPED | OUTPATIENT
Start: 2023-10-18

## 2023-11-13 ENCOUNTER — TRANSCRIBE ORDERS (OUTPATIENT)
Dept: ADMINISTRATIVE | Facility: HOSPITAL | Age: 48
End: 2023-11-13
Payer: MEDICAID

## 2023-11-13 DIAGNOSIS — Z12.31 VISIT FOR SCREENING MAMMOGRAM: Primary | ICD-10-CM

## 2023-11-21 ENCOUNTER — TELEPHONE (OUTPATIENT)
Dept: PSYCHIATRY | Facility: CLINIC | Age: 48
End: 2023-11-21
Payer: MEDICAID

## 2023-11-21 DIAGNOSIS — F90.2 ADHD (ATTENTION DEFICIT HYPERACTIVITY DISORDER), COMBINED TYPE: ICD-10-CM

## 2023-11-21 RX ORDER — LISDEXAMFETAMINE DIMESYLATE CAPSULES 40 MG/1
40 CAPSULE ORAL EVERY MORNING
Qty: 30 CAPSULE | Refills: 0 | Status: SHIPPED | OUTPATIENT
Start: 2023-11-21

## 2023-11-27 ENCOUNTER — TELEPHONE (OUTPATIENT)
Dept: PSYCHIATRY | Facility: CLINIC | Age: 48
End: 2023-11-27
Payer: MEDICAID

## 2023-11-27 NOTE — TELEPHONE ENCOUNTER
Pt called and stated that the pharmacy does not have 40mg Vyvanse. They have 10mg and 20mg. So, pt is wondering if 20mg can be sent in to take twice a day or should she try to find another pharmacy? Please advise

## 2023-11-28 ENCOUNTER — TELEPHONE (OUTPATIENT)
Dept: PSYCHIATRY | Facility: CLINIC | Age: 48
End: 2023-11-28
Payer: MEDICAID

## 2023-11-28 DIAGNOSIS — F90.2 ADHD (ATTENTION DEFICIT HYPERACTIVITY DISORDER), COMBINED TYPE: ICD-10-CM

## 2023-11-28 RX ORDER — LISDEXAMFETAMINE DIMESYLATE CAPSULES 40 MG/1
40 CAPSULE ORAL EVERY MORNING
Qty: 30 CAPSULE | Refills: 0 | Status: SHIPPED | OUTPATIENT
Start: 2023-11-28

## 2023-11-28 NOTE — TELEPHONE ENCOUNTER
Pt left voicemail stating they need their Vyvanse sent to [facility] located at [address] , [place] [address]

## 2023-11-29 ENCOUNTER — OFFICE VISIT (OUTPATIENT)
Dept: FAMILY MEDICINE CLINIC | Facility: CLINIC | Age: 48
End: 2023-11-29
Payer: MEDICAID

## 2023-11-29 VITALS
HEART RATE: 74 BPM | HEIGHT: 70 IN | DIASTOLIC BLOOD PRESSURE: 70 MMHG | OXYGEN SATURATION: 96 % | SYSTOLIC BLOOD PRESSURE: 132 MMHG | WEIGHT: 170 LBS | TEMPERATURE: 98.7 F | RESPIRATION RATE: 16 BRPM | BODY MASS INDEX: 24.34 KG/M2

## 2023-11-29 DIAGNOSIS — R13.12 OROPHARYNGEAL DYSPHAGIA: ICD-10-CM

## 2023-11-29 DIAGNOSIS — C73 PAPILLARY THYROID CARCINOMA: Primary | ICD-10-CM

## 2023-11-29 DIAGNOSIS — E89.0 STATUS POST THYROIDECTOMY: ICD-10-CM

## 2023-11-29 NOTE — PROGRESS NOTES
Subjective   Pooja Whitehead is a 48 y.o. female.     History of Present Illness     She had thyroid cancer and had thyyroidectomy for the CA    Her surgery for thyroid CA was one month ago  Has been on synthroid 125 mcg    Worried this is not an effective dose  Feels more tired and has gained weight    She has also had some tingling in both her feet      She still has throat pressure  This has not gotten better post op  still with that odd sensation    The following portions of the patient's history were reviewed and updated as appropriate: allergies, current medications, past family history, past medical history, past social history, past surgical history, and problem list.    Review of Systems   Constitutional: Negative.        Objective   Physical Exam  Vitals and nursing note reviewed.   Constitutional:       General: She is not in acute distress.     Appearance: Normal appearance. She is well-developed.   Cardiovascular:      Rate and Rhythm: Normal rate and regular rhythm.      Heart sounds: Normal heart sounds.   Pulmonary:      Effort: Pulmonary effort is normal.      Breath sounds: Normal breath sounds.   Neurological:      Mental Status: She is alert and oriented to person, place, and time.   Psychiatric:         Mood and Affect: Mood normal.         Behavior: Behavior normal.         Thought Content: Thought content normal.         Judgment: Judgment normal.       Assessment & Plan   Diagnoses and all orders for this visit:    1. Papillary thyroid carcinoma (Primary)  -     TSH+Free T4  -     T3, free  -     Thyroglobulin  -     Comprehensive Metabolic Panel  -     Magnesium    2. Status post thyroidectomy  -     Comprehensive Metabolic Panel  -     Magnesium    3. Oropharyngeal dysphagia  -     Ambulatory Referral to ENT (Otolaryngology)    Other orders  -     Fluzone >6 Months ([id number])    Will recheck thyroid labs and f/u pending results.  She is currently taking 125 mcg at this time but feels  dose needs to be adjusted.  F/u pending labs    Will work on ENT for dysphagia.  This has persisted even after her thyroidectomy

## 2023-12-01 ENCOUNTER — TELEPHONE (OUTPATIENT)
Dept: FAMILY MEDICINE CLINIC | Facility: CLINIC | Age: 48
End: 2023-12-01
Payer: MEDICAID

## 2023-12-01 DIAGNOSIS — E89.0 STATUS POST THYROIDECTOMY: ICD-10-CM

## 2023-12-01 DIAGNOSIS — C73 PAPILLARY THYROID CARCINOMA: Primary | ICD-10-CM

## 2023-12-01 RX ORDER — LEVOTHYROXINE SODIUM 0.12 MG/1
125 TABLET ORAL DAILY
Qty: 90 TABLET | Refills: 1 | Status: SHIPPED | OUTPATIENT
Start: 2023-12-01

## 2023-12-01 NOTE — TELEPHONE ENCOUNTER
Spoke with patient. She would like to know her results for TSH so she can know about the thyroid medication. Melanie is looking up results and will give paper copy to Dr. Gonzales.

## 2023-12-01 NOTE — TELEPHONE ENCOUNTER
Caller: Pooja Whitehead    Relationship: Self    Best call back number: [phone]     Caller requesting test results: PATIENT    What test was performed: LABS    When was the test performed: 11/29/23    Where was the test performed: [facility]    Additional notes:

## 2023-12-06 DIAGNOSIS — C73 PAPILLARY THYROID CARCINOMA: ICD-10-CM

## 2023-12-06 DIAGNOSIS — E89.0 STATUS POST THYROIDECTOMY: ICD-10-CM

## 2023-12-06 LAB
ALBUMIN SERPL-MCNC: 4.3 G/DL (ref 3.9–4.9)
ALBUMIN/GLOB SERPL: 1.8 {RATIO} (ref 1.2–2.2)
ALP SERPL-CCNC: 65 IU/L (ref 44–121)
ALT SERPL-CCNC: 17 IU/L (ref 0–32)
AST SERPL-CCNC: 16 IU/L (ref 0–40)
BILIRUB SERPL-MCNC: 0.2 MG/DL (ref 0–1.2)
BUN SERPL-MCNC: 9 MG/DL (ref 6–24)
BUN/CREAT SERPL: 13 (ref 9–23)
CALCIUM SERPL-MCNC: 8.7 MG/DL (ref 8.7–10.2)
CHLORIDE SERPL-SCNC: 105 MMOL/L (ref 96–106)
CO2 SERPL-SCNC: 23 MMOL/L (ref 20–29)
CREAT SERPL-MCNC: 0.71 MG/DL (ref 0.57–1)
EGFRCR SERPLBLD CKD-EPI 2021: 105 ML/MIN/1.73
GLOBULIN SER CALC-MCNC: 2.4 G/DL (ref 1.5–4.5)
GLUCOSE SERPL-MCNC: 86 MG/DL (ref 70–99)
MAGNESIUM SERPL-MCNC: 2 MG/DL (ref 1.6–2.3)
POTASSIUM SERPL-SCNC: 4.4 MMOL/L (ref 3.5–5.2)
PROT SERPL-MCNC: 6.7 G/DL (ref 6–8.5)
SODIUM SERPL-SCNC: 141 MMOL/L (ref 134–144)
T3FREE SERPL-MCNC: 2.7 PG/ML (ref 2–4.4)
T4 FREE SERPL-MCNC: 1.47 NG/DL (ref 0.82–1.77)
THYROGLOB SERPL-MCNC: <2 NG/ML
TSH SERPL DL<=0.005 MIU/L-ACNC: 0.34 UIU/ML (ref 0.45–4.5)

## 2023-12-06 RX ORDER — LEVOTHYROXINE SODIUM 0.12 MG/1
125 TABLET ORAL DAILY
Qty: 90 TABLET | Refills: 1 | Status: SHIPPED | OUTPATIENT
Start: 2023-12-06

## 2023-12-20 ENCOUNTER — HOSPITAL ENCOUNTER (OUTPATIENT)
Dept: MAMMOGRAPHY | Facility: HOSPITAL | Age: 48
Discharge: HOME | End: 2023-12-20
Admitting: NURSE PRACTITIONER
Payer: MEDICAID

## 2023-12-20 DIAGNOSIS — Z12.31 VISIT FOR SCREENING MAMMOGRAM: ICD-10-CM

## 2023-12-20 PROCEDURE — 77063 BREAST TOMOSYNTHESIS BI: CPT

## 2023-12-20 PROCEDURE — 77067 SCR MAMMO BI INCL CAD: CPT

## 2023-12-21 DIAGNOSIS — F90.2 ADHD (ATTENTION DEFICIT HYPERACTIVITY DISORDER), COMBINED TYPE: ICD-10-CM

## 2023-12-26 RX ORDER — LISDEXAMFETAMINE DIMESYLATE CAPSULES 40 MG/1
40 CAPSULE ORAL EVERY MORNING
Qty: 30 CAPSULE | Refills: 0 | Status: SHIPPED | OUTPATIENT
Start: 2023-12-26

## 2023-12-26 NOTE — TELEPHONE ENCOUNTER
Pt's regular provider is out of the office. At her last scheduled appointment a UDS was ordered, but the results are not in the system. Please advise pt to have this completed. Pt has an updated DORINDA on file, which is appropriate. This APRN will send in a 30 day supply on regular provider's behalf.

## 2024-01-02 ENCOUNTER — TELEMEDICINE (OUTPATIENT)
Dept: PSYCHIATRY | Facility: CLINIC | Age: 49
End: 2024-01-02
Payer: MEDICAID

## 2024-01-02 DIAGNOSIS — F90.2 ADHD (ATTENTION DEFICIT HYPERACTIVITY DISORDER), COMBINED TYPE: Primary | ICD-10-CM

## 2024-01-02 PROCEDURE — 1159F MED LIST DOCD IN RCRD: CPT | Performed by: NURSE PRACTITIONER

## 2024-01-02 PROCEDURE — 99213 OFFICE O/P EST LOW 20 MIN: CPT | Performed by: NURSE PRACTITIONER

## 2024-01-02 PROCEDURE — 1160F RVW MEDS BY RX/DR IN RCRD: CPT | Performed by: NURSE PRACTITIONER

## 2024-01-02 NOTE — PROGRESS NOTES
"This provider is located at [facility], [address] [place] [address], [facility], [address] using a secure [employer] Video Visit through [employer]. Patient is being seen remotely via telehealth at their work address in [place], and stated they are in a secure environment for this session. The patient's condition being diagnosed/treated is appropriate for telemedicine. The provider identified herself as well as her credentials.   The patient, and/or patients guardian, consent to be seen remotely, and when consent is given they understand that the consent allows for patient identifiable information to be sent to a third party as needed.   They may refuse to be seen remotely at any time. The electronic data is encrypted and password protected, and the patient and/or guardian has been advised of the potential risks to privacy not withstanding such measures.   PT Identifiers used: Name and .    You have chosen to receive care through a telehealth visit.  Do you consent to use a video/audio connection for your medical care today? Yes      Subjective   Pooja Whitehead is a 48 y.o. female who presents today for follow up    Chief Complaint:  \" adhd\"     History of Present Illness:    History of Present Illness  Continues with the Vyvanse at 40 mg every morning.  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  Does report that Strattera was much more effective however had urinary retention from this drug.  Has also trialed Qelbree.  Had thyroidectomy, continues to have adjustments after the surgery, including insomnia.         PHQ-9 Depression Screening  Little interest or pleasure in doing things? (P) 0-->not at all   Feeling down, depressed, or hopeless? (P) 0-->not at all   Trouble falling or staying asleep, or sleeping too much? (P) 3-->nearly every day   Feeling tired or having little energy? (P) 0-->not at all   Poor appetite or overeating? (P) 0-->not at all "   Feeling bad about yourself - or that you are a failure or have let yourself or your family down? (P) 0-->not at all   Trouble concentrating on things, such as reading the newspaper or watching television? (P) 0-->not at all   Moving or speaking so slowly that other people could have noticed? Or the opposite - being so fidgety or restless that you have been moving around a lot more than usual? (P) 0-->not at all   Thoughts that you would be better off dead, or of hurting yourself in some way? (P) 0-->not at all   PHQ-9 Total Score (P) 3   If you checked off any problems, how difficult have these problems made it for you to do your work, take care of things at home, or get along with other people? (P) somewhat difficult     PHQ-9 Total Score: (P) 3      SHELLI-7  Feeling nervous, anxious or on edge: (P) Not at all  Not being able to stop or control worrying: (P) Not at all  Worrying too much about different things: (P) Not at all  Trouble Relaxing: (P) Not at all  Being so restless that it is hard to sit still: (P) Not at all  Feeling afraid as if something awful might happen: (P) Not at all  Becoming easily annoyed or irritable: (P) Not at all  SHELLI 7 Total Score: (P) 0     The following portions of the patient's history were reviewed and updated as appropriate: allergies, current medications, past family history, past medical history, past social history, past surgical history and problem list.    Past Psychiatric History:  Began Treatment: Several years ago  Diagnoses: Diagnosed with ADHD as a child, not treated medically.  Diagnosed with depression and anxiety several years ago.   Psychiatrist:Denies  Therapist: Brief marriage counseling in the past with her first marriage she did not find this effective.  Admission History:Denies  Medication Trials: Recent trial of Pristiq, she was doing 50 mg every other day and seem to be doing okay on it.  Primary care wrote for 25 mg daily and she reports she just has side  effects with the medication and stopped taking it. Qelbree samples for 2 weeks was deemed ineffective. Vyvanse was trialed many years ago was deemed to be successful, highest dose was 60 mg daily.  Trintellix samples gave her a lot of nausea she lost 5 pounds, also gave her urinary retention.  Strattera was a good medicine for the attention deficit but she had severe urinary retention with this and had to stop taking it for that reason.  Trial of methylphenidate in the form of Concerta was deemed ineffective.  Generic Adderall was not very effective, and the higher doses gave her anxiety.  Lexapro gave significant weight gain although it was helpful for her symptoms.  Trial of Effexor she does not remember the results.  Trial of Zoloft reviewed reportedly gave her a lot of sedation even at the lowest dose of 25 mg.  She was on Wellbutrin for an extended length of time in the past.  Self Harm: Denies  Suicide Attempts:Denies   Psychosis, Anxiety, Depression:  Some brief mood changes after each pregnancy.    Past Medical History:  Past Medical History:   Diagnosis Date    ADHD (attention deficit hyperactivity disorder)     Anxiety     Disease of thyroid gland     Multinodular goitor    Factor 5 Leiden mutation, heterozygous     Headache     Papillary thyroid carcinoma 2023       Substance Abuse History:   Types:Denies all, including illicit      Social History:  Social History     Socioeconomic History    Marital status:     Number of children: 4    Highest education level: Master's degree (e.g., MA, MS, Rafia, MEd, MSW, GAVI)   Tobacco Use    Smoking status: Never    Smokeless tobacco: Never   Substance and Sexual Activity    Alcohol use: No    Drug use: No    Sexual activity: Yes     Birth control/protection: Surgical     Comment: TUBAL    Patient owns her own business.  Denies any history of  service, denies any legal problems.  Spouse is very supportive.  Endorses [employer]  el[employer].  Adopted her sister's male child at the child's birth.    Family History:  Family History   Problem Relation Age of Onset    Alcohol abuse Mother     Hypertension Mother     Hyperlipidemia Mother     Aortic aneurysm Mother     Parkinsonism Father     Ki Parkinson White syndrome Father     Hemochromatosis Father     OCD Sister     ADD / ADHD Son     Breast cancer Paternal Grandmother         DX AGE POST MENOPAUSAL    Breast cancer Paternal Aunt         DX AGE 40'S    Breast cancer Paternal Aunt         DX AGE 40'S    Breast cancer Maternal Cousin 46    Breast cancer Paternal Cousin 46    Ovarian cancer Neg Hx        Past Surgical History:  Past Surgical History:   Procedure Laterality Date    ABDOMINAL SURGERY  , ,     C/S    AUGMENTATION MAMMAPLASTY Bilateral 2009    SILICONE , retropectoral     SECTION      three total    D & C HYSTEROSCOPY ENDOMETRIAL ABLATION N/A 2017    Procedure: DILATATION AND CURETTAGE HYSTEROSCOPY NOVASURE ENDOMETRIAL ABLATION;  Surgeon: Toshia Morrell DO;  Location: [facility] OR;  Service:     TUBAL ABDOMINAL LIGATION         Problem List:  Patient Active Problem List   Diagnosis    Migraine without aura and without status migrainosus, not intractable    Restless leg syndrome    Attention deficit disorder of adult    Mixed anxiety depressive disorder    Factor V Leiden mutation    Non-toxic multinodular goiter    Bilateral hearing loss    Papillary thyroid carcinoma       Allergy:   Allergies   Allergen Reactions    Atomoxetine Other (See Comments)     Urinary retention    Strattera [Atomoxetine Hcl] Urinary Retention    Nickel Rash     Skin reaction        Current Medications:   Current Outpatient Medications   Medication Sig Dispense Refill    levothyroxine (Synthroid) 125 MCG tablet Take 1 tablet by mouth Daily. 90 tablet 1    lisdexamfetamine (Vyvanse) 40 MG capsule Take 1 capsule by mouth Every Morning 30 capsule 0    Progesterone (PROMETRIUM) 100 MG  capsule Take 1 capsule by mouth every night at bedtime.      SUMAtriptan (IMITREX) 100 MG tablet Take 1 tablet by mouth Every 2 (Two) Hours As Needed for Migraine. 9 tablet 5     No current facility-administered medications for this visit.       Review of Systems:    Review of Systems   Psychiatric/Behavioral:  Positive for decreased concentration and sleep disturbance.    All other systems reviewed and are negative.        Physical Exam:   Physical Exam  Vitals reviewed.   Constitutional:       Appearance: Normal appearance.   HENT:      Head: Normocephalic.   Neurological:      Mental Status: She is alert.   Psychiatric:         Attention and Perception: Attention and perception normal.         Mood and Affect: Mood and affect normal.         Speech: Speech normal.         Behavior: Behavior normal. Behavior is cooperative.         Thought Content: Thought content normal.         Cognition and Memory: Cognition and memory normal.         Judgment: Judgment normal.         Vitals:  not currently breastfeeding. There is no height or weight on file to calculate BMI.  Due to extenuating circumstances and possible current health risks associated with the patient being present in a clinical setting (with current health restrictions in place in regards to possible COVID 19 transmission/exposure), the patient was seen remotely today via a MyChart Video Visit through [facility] and telephone encounter.  Unable to obtain vital signs due to nature of remote visit.  Height stated at 70 inches.  Weight stated at 168 pounds.    Last 3 Blood Pressure Readings:  BP Readings from Last 3 Encounters:   11/29/23 132/70   02/24/23 118/74   10/21/22 132/80       Mental Status Exam:   Hygiene:   good  Cooperation:  Cooperative  Eye Contact:  Good  Psychomotor Behavior:  Appropriate  Affect:  Full range  Mood: normal  Hopelessness: Denies  Speech:  Normal  Thought Process:  Goal directed and Linear  Thought Content:  Normal  Suicidal:   None  Homicidal:  None  Hallucinations:  None  Delusion:  None  Memory:  Intact  Orientation:  Person, Place, Time and Situation  Reliability:  good  Insight:  Good  Judgement:  Good  Impulse Control:  Good  Physical/Medical Issues:  No        Lab Results:        No visits with results within 1 Month(s) from this visit.   Latest known visit with results is:   Office Visit on 11/29/2023   Component Date Value Ref Range Status    TSH 11/29/2023 0.339 (L)  0.450 - 4.500 uIU/mL Final    Free T4 11/29/2023 1.47  0.82 - 1.77 ng/dL Final    T3, Free 11/29/2023 2.7  2.0 - 4.4 pg/mL Final    Thyroglobulin (TG-MICHAEL) 11/29/2023 <2.0  ng/mL Final    Comment: This test was developed and its performance characteristics  determined by [facility]. It has not been cleared or approved  by the Food and Drug Administration.  Reference Range:  Pubertal Children  and Adults: <40  According to the National Academy of Clinical Biochemistry,  the reference interval for Thyroglobulin (TG) should be  related to euthyroid patients and not for patients who  underwent thyroidectomy.  TG reference intervals for these  patients depend on the residual mass of the thyroid tissue  left after surgery.  Establishing a post-operative baseline  is recommended.  The assay quantitation limit is 2.0 ng/mL.      Glucose 11/29/2023 86  70 - 99 mg/dL Final    BUN 11/29/2023 9  6 - 24 mg/dL Final    Creatinine 11/29/2023 0.71  0.57 - 1.00 mg/dL Final    EGFR Result 11/29/2023 105  >59 mL/min/1.73 Final    BUN/Creatinine Ratio 11/29/2023 13  9 - 23 Final    Sodium 11/29/2023 141  134 - 144 mmol/L Final    Potassium 11/29/2023 4.4  3.5 - 5.2 mmol/L Final    Chloride 11/29/2023 105  96 - 106 mmol/L Final    Total CO2 11/29/2023 23  20 - 29 mmol/L Final    Calcium 11/29/2023 8.7  8.7 - 10.2 mg/dL Final    Total Protein 11/29/2023 6.7  6.0 - 8.5 g/dL Final    Albumin 11/29/2023 4.3  3.9 - 4.9 g/dL Final    Globulin 11/29/2023 2.4  1.5 - 4.5 g/dL Final    A/G Ratio  "11/29/2023 1.8  1.2 - 2.2 Final    Total Bilirubin 11/29/2023 0.2  0.0 - 1.2 mg/dL Final    Alkaline Phosphatase 11/29/2023 65  44 - 121 IU/L Final    AST (SGOT) 11/29/2023 16  0 - 40 IU/L Final    ALT (SGPT) 11/29/2023 17  0 - 32 IU/L Final    Magnesium 11/29/2023 2.0  1.6 - 2.3 mg/dL Final         Assessment & Plan   Diagnoses and all orders for this visit:    1. ADHD (attention deficit hyperactivity disorder), combined type (Primary)            Visit Diagnoses:    ICD-10-CM ICD-9-CM   1. ADHD (attention deficit hyperactivity disorder), combined type  F90.2 314.01             GOALS:  Short Term Goals: Patient will be compliant with medication, and patient will have no significant medication related side effects.  Patient will be engaged in psychotherapy as indicated.  Patient will report subjective improvement of symptoms.  Long term goals: To stabilize mood and treat/improve subjective symptoms, the patient will stay out of the hospital, the patient will be at an optimal level of functioning, and the patient will take all medications as prescribed.  The patient/guardian verbalized understanding and agreement with goals that were mutually set.    RISK ASSESSMENT  Current harm-to-self risk is reported by pt as \"none.\"  Current [id number] risk is reported by pt as \"none.\"   No suicidal thoughts, intent, plan is appreciated by this provider on this date of exam.   No homicidal thoughts, intent, plan is appreciated by this provider on this date.    TREATMENT PLAN: Continue supportive psychotherapy efforts and medications as indicated.  Pharmacological and Non-Pharmacological treatment options discussed during today's visit. Patient/Guardian acknowledged and verbally consented with current treatment plan and was educated on the importance of compliance with treatment and follow-up appointments.      MEDICATION ISSUES:  Discussed medication options and treatment plan of prescribed medication as well as the risks, " "benefits, any black box warnings, and side effects including potential falls, possible impaired driving, and metabolic adversities among others. Patient is agreeable to call the office with any worsening of symptoms or onset of side effects, or if any concerns or questions arise.  The contact information for the office is made available to the patient. Patient is agreeable to call 911 or go to the nearest ER should they begin having any SI/HI, or if any urgent concerns arise.      Continue Vyvanse at 40 mg every morning  Call one week prior to needing refill   Patient had urine drug screen performed, she pulled it up on her [facility] account.  It was apparently performed under my previous surname, \"Aden.\"  Notified staff to pull out this lab and put in chart.    MEDS ORDERED DURING VISIT:  No orders of the defined types were placed in this encounter.  No prescription needed today recently filled    Follow Up Appointment:   Return in about 3 months (around 4/2/2024) for Recheck, Video visit.                  This document has been electronically signed by YOMAIRA Hayden  January 2, 2024 10:12 EST    Dictated Utilizing Dragon Dictation: Part of this note may be an electronic transcription/translation of spoken language to printed text using the Dragon Dictation System.  "

## 2024-01-02 NOTE — PATIENT INSTRUCTIONS
For concerns or needing assistance call the [facility] at [phone]

## 2024-01-03 ENCOUNTER — TELEPHONE (OUTPATIENT)
Dept: PSYCHIATRY | Facility: CLINIC | Age: 49
End: 2024-01-03
Payer: MEDICAID

## 2024-01-03 NOTE — TELEPHONE ENCOUNTER
----- Message from YOMAIRA Hayden sent at 1/2/2024 12:01 PM EST -----  UDS is not listed in these labs. These were ordered by another provider.       Scanned to chart

## 2024-01-12 ENCOUNTER — OFFICE VISIT (OUTPATIENT)
Dept: FAMILY MEDICINE CLINIC | Facility: CLINIC | Age: 49
End: 2024-01-12
Payer: MEDICAID

## 2024-01-12 ENCOUNTER — TELEPHONE (OUTPATIENT)
Dept: FAMILY MEDICINE CLINIC | Facility: CLINIC | Age: 49
End: 2024-01-12

## 2024-01-12 VITALS
OXYGEN SATURATION: 97 % | BODY MASS INDEX: 25.05 KG/M2 | WEIGHT: 175 LBS | HEART RATE: 75 BPM | SYSTOLIC BLOOD PRESSURE: 130 MMHG | RESPIRATION RATE: 16 BRPM | HEIGHT: 70 IN | TEMPERATURE: 97.6 F | DIASTOLIC BLOOD PRESSURE: 70 MMHG

## 2024-01-12 DIAGNOSIS — E55.9 VITAMIN D DEFICIENCY: ICD-10-CM

## 2024-01-12 DIAGNOSIS — E89.0 H/O THYROIDECTOMY: Primary | ICD-10-CM

## 2024-01-12 DIAGNOSIS — E89.0 H/O THYROIDECTOMY: ICD-10-CM

## 2024-01-12 DIAGNOSIS — G62.9 NEUROPATHY: Primary | ICD-10-CM

## 2024-01-12 DIAGNOSIS — G43.009 MIGRAINE WITHOUT AURA AND WITHOUT STATUS MIGRAINOSUS, NOT INTRACTABLE: ICD-10-CM

## 2024-01-12 PROBLEM — Z90.89 H/O THYROIDECTOMY: Status: ACTIVE | Noted: 2024-01-12

## 2024-01-12 PROBLEM — Z98.890 H/O THYROIDECTOMY: Status: ACTIVE | Noted: 2024-01-12

## 2024-01-12 PROCEDURE — 99214 OFFICE O/P EST MOD 30 MIN: CPT | Performed by: FAMILY MEDICINE

## 2024-01-12 RX ORDER — SUMATRIPTAN 100 MG/1
100 TABLET, FILM COATED ORAL
Qty: 9 TABLET | Refills: 5 | Status: SHIPPED | OUTPATIENT
Start: 2024-01-12

## 2024-01-12 NOTE — PROGRESS NOTES
Subjective   Pooja Whitehead is a 48 y.o. female.     History of Present Illness     She has continued to have tingling in both legs  She has had this since her thyroid surgery  Pis and needles sensation  From the knees down    Nothing makes it better nor worse    She has no weakness and continues to exercise      She also needs a refill of her imitrex  Rare use of it but it really does help and she would like this available again      The following portions of the patient's history were reviewed and updated as appropriate: allergies, current medications, past family history, past medical history, past social history, past surgical history, and problem list.    Review of Systems   Constitutional: Negative.    Psychiatric/Behavioral: Negative.         Objective   Physical Exam  Vitals and nursing note reviewed.   Constitutional:       General: She is not in acute distress.     Appearance: Normal appearance. She is well-developed.   Cardiovascular:      Rate and Rhythm: Normal rate and regular rhythm.      Heart sounds: Normal heart sounds.   Pulmonary:      Effort: Pulmonary effort is normal.      Breath sounds: Normal breath sounds.   Neurological:      Mental Status: She is alert and oriented to person, place, and time.   Psychiatric:         Mood and Affect: Mood normal.         Behavior: Behavior normal.         Thought Content: Thought content normal.         Judgment: Judgment normal.       Assessment & Plan   Diagnoses and all orders for this visit:    1. Neuropathy (Primary)  -     Vitamin B12 & Folate  -     Comprehensive Metabolic Panel  -     Ambulatory Referral to Neurology    2. H/O thyroidectomy  -     TSH+Free T4  -     Comprehensive Metabolic Panel    3. Vitamin D deficiency  -     Vitamin D,25-Hydroxy    4. Migraine without aura and without status migrainosus, not intractable  -     SUMAtriptan (IMITREX) 100 MG tablet; Take 1 tablet by mouth Every 2 (Two) Hours As Needed for Migraine.  Dispense:  9 tablet; Refill: 5    She has simple pins and needles BLE from knees down in stocking distribution without cause elicited with history.  She notes this has been present since her thyroid surgery.    Will check B12 AND REFER TO NEUROLOGY FOR EVALUATION.  I DISCUSSED NEURONTIN AND LYRICA BUT PT DOES NOT WANT MEDIcations  F/u pending labs  Will recheck thyroid levels

## 2024-01-13 LAB
25(OH)D3+25(OH)D2 SERPL-MCNC: 23.4 NG/ML (ref 30–100)
ALBUMIN SERPL-MCNC: 4.6 G/DL (ref 3.9–4.9)
ALBUMIN/GLOB SERPL: 1.6 {RATIO} (ref 1.2–2.2)
ALP SERPL-CCNC: 66 IU/L (ref 44–121)
ALT SERPL-CCNC: 18 IU/L (ref 0–32)
AST SERPL-CCNC: 19 IU/L (ref 0–40)
BILIRUB SERPL-MCNC: 0.3 MG/DL (ref 0–1.2)
BUN SERPL-MCNC: 10 MG/DL (ref 6–24)
BUN/CREAT SERPL: 13 (ref 9–23)
CALCIUM SERPL-MCNC: 8.7 MG/DL (ref 8.7–10.2)
CHLORIDE SERPL-SCNC: 104 MMOL/L (ref 96–106)
CO2 SERPL-SCNC: 21 MMOL/L (ref 20–29)
CREAT SERPL-MCNC: 0.77 MG/DL (ref 0.57–1)
EGFRCR SERPLBLD CKD-EPI 2021: 95 ML/MIN/1.73
FOLATE SERPL-MCNC: >20 NG/ML
GLOBULIN SER CALC-MCNC: 2.8 G/DL (ref 1.5–4.5)
GLUCOSE SERPL-MCNC: 84 MG/DL (ref 70–99)
POTASSIUM SERPL-SCNC: 4.2 MMOL/L (ref 3.5–5.2)
PROT SERPL-MCNC: 7.4 G/DL (ref 6–8.5)
SODIUM SERPL-SCNC: 140 MMOL/L (ref 134–144)
T4 FREE SERPL-MCNC: 1.51 NG/DL (ref 0.82–1.77)
TSH SERPL DL<=0.005 MIU/L-ACNC: 0.2 UIU/ML (ref 0.45–4.5)
VIT B12 SERPL-MCNC: 884 PG/ML (ref 232–1245)

## 2024-01-14 DIAGNOSIS — E89.0 STATUS POST THYROIDECTOMY: ICD-10-CM

## 2024-01-14 DIAGNOSIS — E89.0 STATUS POST THYROIDECTOMY: Primary | ICD-10-CM

## 2024-01-14 RX ORDER — LEVOTHYROXINE SODIUM 0.1 MG/1
100 TABLET ORAL DAILY
Qty: 30 TABLET | Refills: 1 | Status: SHIPPED | OUTPATIENT
Start: 2024-01-14

## 2024-01-15 DIAGNOSIS — E89.0 STATUS POST THYROIDECTOMY: ICD-10-CM

## 2024-01-15 DIAGNOSIS — E89.0 H/O THYROIDECTOMY: ICD-10-CM

## 2024-01-15 RX ORDER — LEVOTHYROXINE SODIUM 0.1 MG/1
100 TABLET ORAL DAILY
Qty: 90 TABLET | OUTPATIENT
Start: 2024-01-15

## 2024-01-25 DIAGNOSIS — F90.2 ADHD (ATTENTION DEFICIT HYPERACTIVITY DISORDER), COMBINED TYPE: ICD-10-CM

## 2024-01-25 RX ORDER — LISDEXAMFETAMINE DIMESYLATE CAPSULES 40 MG/1
40 CAPSULE ORAL EVERY MORNING
Qty: 30 CAPSULE | Refills: 0 | Status: SHIPPED | OUTPATIENT
Start: 2024-01-25

## 2024-02-02 ENCOUNTER — TELEPHONE (OUTPATIENT)
Dept: FAMILY MEDICINE CLINIC | Facility: CLINIC | Age: 49
End: 2024-02-02
Payer: MEDICAID

## 2024-02-02 DIAGNOSIS — E89.0 STATUS POST THYROIDECTOMY: ICD-10-CM

## 2024-02-02 NOTE — TELEPHONE ENCOUNTER
Caller: Pooja Whitehead    Relationship: Self    Best call back number: [phone]     Which medication are you concerned about: LEVOTHYROXINE    Who prescribed you this medication: DR DOMINGUEZ    When did you start taking this medication: 1/13/24    What are your concerns: PATIENT STATES THAT SHE HAS EXTREME FATIGUE, SLEEPING 12 HOURS A NIGHT    How long have you had these concerns: STARTED A COUPLE OF WEEKS AFTER STARTING THE NEW DOSE  MCG.    PATIENT HAS GONE BACK TO THE PREVIOUS DOSE OF 125MCG.

## 2024-02-06 RX ORDER — LEVOTHYROXINE SODIUM 0.12 MG/1
125 TABLET ORAL DAILY
Qty: 30 TABLET | Refills: 1 | Status: SHIPPED | OUTPATIENT
Start: 2024-02-06 | End: 2024-02-08

## 2024-02-06 NOTE — TELEPHONE ENCOUNTER
Noted, will continue 125  With her history of thyroid cancer we do want to keep her TSH suppressed

## 2024-02-07 DIAGNOSIS — E89.0 STATUS POST THYROIDECTOMY: ICD-10-CM

## 2024-02-08 RX ORDER — LEVOTHYROXINE SODIUM 0.12 MG/1
125 TABLET ORAL DAILY
Qty: 90 TABLET | Refills: 0 | Status: SHIPPED | OUTPATIENT
Start: 2024-02-08

## 2024-02-18 LAB
PHOSPHATE SERPL-MCNC: 3.2 MG/DL (ref 3–4.3)
PTH-INTACT SERPL-MCNC: 35 PG/ML (ref 15–65)
T4 FREE SERPL-MCNC: 1.27 NG/DL (ref 0.82–1.77)
TSH SERPL DL<=0.005 MIU/L-ACNC: 0.16 UIU/ML (ref 0.45–4.5)

## 2024-02-20 DIAGNOSIS — E05.90 SUBCLINICAL HYPERTHYROIDISM: Primary | ICD-10-CM

## 2024-02-21 ENCOUNTER — TELEPHONE (OUTPATIENT)
Dept: FAMILY MEDICINE CLINIC | Facility: CLINIC | Age: 49
End: 2024-02-21
Payer: MEDICAID

## 2024-02-21 DIAGNOSIS — E89.0 STATUS POST THYROIDECTOMY: Primary | ICD-10-CM

## 2024-02-21 NOTE — TELEPHONE ENCOUNTER
Pt called and said she came in to see Dr. Gonzales for numbness and tingling in legs a while back. He adjusted her thyroid medication a few times but she wants to know if there is an endocrinologist that could talk with her about managing symptoms with the low thyroid. She said when the medication dose was lowered, the symptoms went away but she also doesn't want to cause permanent damage or put her back in the situation with thyroid cancer. She stated it is manageable but she would like to know if there was anything she could do or speak with to make it just a little bit more manageable.     Pojoa [phone]

## 2024-03-01 NOTE — PROGRESS NOTES
Chief complaint/Reason for consult: Papillary thyroid carcinoma    Consult requested by Buck Gonzales MD    HPI: Ms. Whitehead is a 48-year-old female with history of papillary thyroid carcinoma status post resection who comes for consultation of hypothyroidism with history of thyroid cancer.    Surgery date: Initial lobectomy done 8/3/2023, completion thyroidectomy done 11/1/2023 at [facility] in [place], [place]  Tumor size: Multifocal papillary thyroid carcinoma with 2 foci of cancer detected in the right thyroid lobe, the largest 0.4 cm and the smallest is less than 0.1 cm and she reports left lobe with sub-centimeter PTC as well   Lymph node status: 3 benign perithyroidal lymph nodes (Level VI)  No tumor necrosis identified  No extrathyroidal invasion   Negative surgical margins  I-131 was not done   No distant mets   TNM classification [id number]  AJCC stage 1  BRUCE risk: Low risk     Follow-up imaging and labs    11/29/2023  TSH 0.339  FT4 1.47  Tg < 2.0   TgAb not checked     2/16/2024  TSH 0.157  FT4 1.27     # Post surgical hypothyroidism   -Currently taking levothyroxine 125 mcg daily with good compliance with  -Reports bilateral lower extremity leg numbness that started after taking this and is improving  -Initially had some palpitations and jitteriness which has since resolved although now has some fatigue    Past medical history, past surgical history, family history and social history reviewed within this encounter.     Review of Systems   Constitutional:  Positive for fatigue. Negative for activity change and unexpected weight change (trouble losing weight).   HENT:  Negative for trouble swallowing and voice change.    Eyes:  Negative for visual disturbance.   Respiratory:  Negative for shortness of breath.    Cardiovascular:  Negative for chest pain and palpitations.   Gastrointestinal:  Negative for abdominal pain.   Endocrine: Negative for cold intolerance and heat intolerance.   Musculoskeletal:   "Negative for gait problem.   Skin:  Negative for rash.   Neurological:  Positive for numbness.   Psychiatric/Behavioral:  Negative for agitation and confusion.         /72   Pulse 88   Ht 177.8 cm (70\")   Wt 77.6 kg (171 lb)   SpO2 99%   BMI 24.54 kg/m²      Physical Exam  Vitals reviewed.   Constitutional:       General: She is not in acute distress.  HENT:      Head: Normocephalic.      Nose: Nose normal.   Eyes:      Conjunctiva/sclera: Conjunctivae normal.   Neck:      Comments: No palpable thyroid tissue  Cardiovascular:      Rate and Rhythm: Normal rate.      Pulses: Normal pulses.   Pulmonary:      Effort: Pulmonary effort is normal.   Abdominal:      Tenderness: There is no guarding.   Musculoskeletal:         General: No deformity.      Cervical back: Neck supple.   Skin:     General: Skin is warm and dry.   Neurological:      Mental Status: She is alert and oriented to person, place, and time.   Psychiatric:         Mood and Affect: Mood normal.         Behavior: Behavior normal.        Labs and images reviewed as noted in the HPI    Assessment and plan:    Diagnoses and all orders for this visit:    1. Postoperative hypothyroidism (Primary)  Assessment & Plan:  -Patient had low risk thyroid cancer with excellent response to treatment  -Goal TSH around 0.5  -Recommend reducing levothyroxine to 112 mcg daily and getting repeat TSH/free T4 in 6 weeks    Orders:  -     Cancel: TSH; Future  -     Cancel: T4, Free; Future  -     levothyroxine (SYNTHROID, LEVOTHROID) 112 MCG tablet; Take one tablet daily by mouth  Dispense: 90 tablet; Refill: 1  -     T4, Free; Future  -     TSH; Future    2. Papillary thyroid carcinoma  -     Cancel: Thyroglobulin Antibody and Thyroglobulin, NASRIN or LC/MS-MS; Future  -     US Head Neck Soft Tissue; Future  -     Thyroglobulin Antibody and Thyroglobulin, NASRIN or LC/MS-MS; Future    Surgery date: Initial lobectomy done 8/3/2023, completion thyroidectomy done 11/1/2023 at " [facility] in [place], [place]  Tumor size: Multifocal papillary thyroid carcinoma with 2 foci of cancer detected in the right thyroid lobe, the largest 0.4 cm and the smallest is less than 0.1 cm and she reports left lobe with sub-centimeter PTC as well; I have requested the pathology report for left lobectomy  Lymph node status: 3 benign perithyroidal lymph nodes (Level VI)  No tumor necrosis identified  No extrathyroidal invasion   Negative surgical margins  I-131 was not done; discussed risks and benefits of it at this time and she prefers to defer which is reasonable  No distant mets   TNM classification [id number]  AJCC stage 1  BRUCE risk: Low risk     Follow-up imaging and labs    11/29/2023  TSH 0.339  FT4 1.47  Tg < 2.0   TgAb not checked     2/16/2024  TSH 0.157  FT4 1.27     Return in about 6 months (around 9/6/2024) for Hypothyroidism/PTC.     Electronically signed by: Kyle S Rosenstein, MD   Addendum  Pathology report seen for left thyroid lobe/completion thyroidectomy and is in media tab.  There is a 0.03 cm papillary carcinoma, follicular variant, encapsulated, well-demarcated, noninvasive.

## 2024-03-05 DIAGNOSIS — F90.2 ADHD (ATTENTION DEFICIT HYPERACTIVITY DISORDER), COMBINED TYPE: ICD-10-CM

## 2024-03-05 RX ORDER — LISDEXAMFETAMINE DIMESYLATE CAPSULES 40 MG/1
40 CAPSULE ORAL EVERY MORNING
Qty: 30 CAPSULE | Refills: 0 | Status: SHIPPED | OUTPATIENT
Start: 2024-03-05

## 2024-03-06 ENCOUNTER — OFFICE VISIT (OUTPATIENT)
Dept: ENDOCRINOLOGY | Facility: CLINIC | Age: 49
End: 2024-03-06
Payer: MEDICAID

## 2024-03-06 VITALS
WEIGHT: 171 LBS | BODY MASS INDEX: 24.48 KG/M2 | DIASTOLIC BLOOD PRESSURE: 72 MMHG | HEART RATE: 88 BPM | OXYGEN SATURATION: 99 % | SYSTOLIC BLOOD PRESSURE: 138 MMHG | HEIGHT: 70 IN

## 2024-03-06 DIAGNOSIS — E89.0 POSTOPERATIVE HYPOTHYROIDISM: Primary | ICD-10-CM

## 2024-03-06 DIAGNOSIS — C73 PAPILLARY THYROID CARCINOMA: ICD-10-CM

## 2024-03-06 RX ORDER — LEVOTHYROXINE SODIUM 112 UG/1
TABLET ORAL
Qty: 90 TABLET | Refills: 1 | Status: SHIPPED | OUTPATIENT
Start: 2024-03-06

## 2024-03-06 NOTE — ASSESSMENT & PLAN NOTE
-Patient had low risk thyroid cancer with excellent response to treatment  -Goal TSH around 0.5  -Recommend reducing levothyroxine to 112 mcg daily and getting repeat TSH/free T4 in 6 weeks

## 2024-03-07 ENCOUNTER — TELEPHONE (OUTPATIENT)
Dept: ENDOCRINOLOGY | Facility: CLINIC | Age: 49
End: 2024-03-07
Payer: MEDICAID

## 2024-03-07 NOTE — TELEPHONE ENCOUNTER
----- Message from Kyle S Rosenstein, MD sent at 3/6/2024 12:08 PM EST -----  Regarding: pathology  Could we get her pathology report for her left lobectomy done at [facility] on 11/1/2023; I have the right lobectomy pathology from 8/23    Thanks

## 2024-04-02 ENCOUNTER — PRIOR AUTHORIZATION (OUTPATIENT)
Dept: PSYCHIATRY | Facility: CLINIC | Age: 49
End: 2024-04-02

## 2024-04-02 ENCOUNTER — TELEMEDICINE (OUTPATIENT)
Dept: PSYCHIATRY | Facility: CLINIC | Age: 49
End: 2024-04-02
Payer: MEDICAID

## 2024-04-02 DIAGNOSIS — F90.2 ADHD (ATTENTION DEFICIT HYPERACTIVITY DISORDER), COMBINED TYPE: Primary | ICD-10-CM

## 2024-04-02 RX ORDER — LISDEXAMFETAMINE DIMESYLATE CAPSULES 40 MG/1
40 CAPSULE ORAL EVERY MORNING
Qty: 30 CAPSULE | Refills: 0 | Status: SHIPPED | OUTPATIENT
Start: 2024-04-02

## 2024-04-02 NOTE — PATIENT INSTRUCTIONS
For concerns or needing assistance call the [facility] at [phone]  Continue Vyvanse at 40 mg every morning  Call one week prior to needing refill

## 2024-04-16 ENCOUNTER — TELEPHONE (OUTPATIENT)
Dept: ENDOCRINOLOGY | Facility: CLINIC | Age: 49
End: 2024-04-16
Payer: MEDICAID

## 2024-04-16 NOTE — TELEPHONE ENCOUNTER
Called [facility] to have results faxed. Pt. Will lose her insurance at the end of the month and if further testing needed she would like to get done prior to the end of the month. CHIN  
Pt calling to get results read to her for recent Ultrasound, would like call back  
normal (ped)...

## 2024-04-30 LAB
T4 FREE SERPL-MCNC: 1.5 NG/DL (ref 0.82–1.77)
THYROGLOB AB SERPL-ACNC: <1 IU/ML (ref 0–0.9)
THYROGLOB SERPL-MCNC: 1.6 NG/ML (ref 1.5–38.5)
TSH SERPL DL<=0.005 MIU/L-ACNC: 0.37 UIU/ML (ref 0.45–4.5)

## 2024-05-06 ENCOUNTER — TELEPHONE (OUTPATIENT)
Dept: PSYCHIATRY | Facility: CLINIC | Age: 49
End: 2024-05-06
Payer: MEDICAID

## 2024-05-06 DIAGNOSIS — F90.2 ADHD (ATTENTION DEFICIT HYPERACTIVITY DISORDER), COMBINED TYPE: ICD-10-CM

## 2024-05-06 RX ORDER — LISDEXAMFETAMINE DIMESYLATE 40 MG/1
40 CAPSULE ORAL EVERY MORNING
Qty: 30 CAPSULE | Refills: 0 | Status: SHIPPED | OUTPATIENT
Start: 2024-05-06 | End: 2024-05-08

## 2024-05-08 DIAGNOSIS — F90.2 ADHD (ATTENTION DEFICIT HYPERACTIVITY DISORDER), COMBINED TYPE: Primary | ICD-10-CM

## 2024-05-08 RX ORDER — DEXTROAMPHETAMINE SACCHARATE, AMPHETAMINE ASPARTATE, DEXTROAMPHETAMINE SULFATE AND AMPHETAMINE SULFATE 2.5; 2.5; 2.5; 2.5 MG/1; MG/1; MG/1; MG/1
10 TABLET ORAL 2 TIMES DAILY
Qty: 60 TABLET | Refills: 0 | Status: SHIPPED | OUTPATIENT
Start: 2024-05-08

## 2024-07-23 ENCOUNTER — TELEMEDICINE (OUTPATIENT)
Dept: PSYCHIATRY | Facility: CLINIC | Age: 49
End: 2024-07-23
Payer: MEDICAID

## 2024-07-23 DIAGNOSIS — F90.2 ADHD (ATTENTION DEFICIT HYPERACTIVITY DISORDER), COMBINED TYPE: Primary | ICD-10-CM

## 2024-07-23 RX ORDER — DEXTROAMPHETAMINE SACCHARATE, AMPHETAMINE ASPARTATE MONOHYDRATE, DEXTROAMPHETAMINE SULFATE AND AMPHETAMINE SULFATE 6.25; 6.25; 6.25; 6.25 MG/1; MG/1; MG/1; MG/1
25 CAPSULE, EXTENDED RELEASE ORAL EVERY MORNING
Qty: 30 CAPSULE | Refills: 0 | Status: SHIPPED | OUTPATIENT
Start: 2024-07-23

## 2024-07-23 NOTE — PROGRESS NOTES
"This provider is located at home office address in [place] for [facility], [address], [address] using a secure MyChart Video Visit through [employer]. Patient is being seen remotely via telehealth at their home address in [place], and stated they are in a secure environment for this session. The patient's condition being diagnosed/treated is appropriate for telemedicine. The provider identified herself as well as her credentials.   The patient, and/or patients guardian, consent to be seen remotely, and when consent is given they understand that the consent allows for patient identifiable information to be sent to a third party as needed.   They may refuse to be seen remotely at any time. The electronic data is encrypted and password protected, and the patient and/or guardian has been advised of the potential risks to privacy not withstanding such measures.   PT Identifiers used: Name and .    You have chosen to receive care through a telehealth visit.  Do you consent to use a video/audio connection for your medical care today? Yes  Patient verbally confirmed consent for today's encounter  2024      Subjective   Pooja Whitehead is a 48 y.o. female who presents today for follow up    Chief Complaint:  \" adhd\"     History of Present Illness:    History of Present Illness  Adderall has not been that effective at current dosage of 10mg BID. Pt forgets to take the 2nd dose frequently. Discussed switching to the XR formulation. Pt has no insurance and will have to pay for the medication.   ADHD symptoms of decreased focus and concentration were improved on Vyvanse but even the generic is expensive without insurance coverage.     Does report that Strattera was much more effective however had urinary retention from this drug.  Has also trialed Qelbree.            PHQ-9 Depression Screening  Little interest or pleasure in doing things? (P) 0-->not at all   Feeling down, depressed, or hopeless? (P) " 0-->not at all   Trouble falling or staying asleep, or sleeping too much? (P) 0-->not at all   Feeling tired or having little energy? (P) 0-->not at all   Poor appetite or overeating? (P) 0-->not at all   Feeling bad about yourself - or that you are a failure or have let yourself or your family down? (P) 0-->not at all   Trouble concentrating on things, such as reading the newspaper or watching television? (P) 1-->several days   Moving or speaking so slowly that other people could have noticed? Or the opposite - being so fidgety or restless that you have been moving around a lot more than usual? (P) 0-->not at all   Thoughts that you would be better off dead, or of hurting yourself in some way? (P) 0-->not at all   PHQ-9 Total Score (P) 1   If you checked off any problems, how difficult have these problems made it for you to do your work, take care of things at home, or get along with other people? (P) somewhat difficult     PHQ-9 Total Score: (P) 1      SHELLI-7  Feeling nervous, anxious or on edge: (P) Not at all  Not being able to stop or control worrying: (P) Not at all  Worrying too much about different things: (P) Not at all  Trouble Relaxing: (P) Not at all  Being so restless that it is hard to sit still: (P) Not at all  Feeling afraid as if something awful might happen: (P) Not at all  Becoming easily annoyed or irritable: (P) Several days  SHELLI 7 Total Score: (P) 1  If you checked any problems, how difficult have these problems made it for you to do your work, take care of things at home, or get along with other people: (P) Not difficult at all     The following portions of the patient's history were reviewed and updated as appropriate: allergies, current medications, past family history, past medical history, past social history, past surgical history and problem list.    Past Psychiatric History:  Began Treatment: Several years ago  Diagnoses: Diagnosed with ADHD as a child, not treated medically.  Diagnosed  with depression and anxiety several years ago.   Psychiatrist:Denies  Therapist: Brief marriage counseling in the past with her first marriage she did not find this effective.  Admission History:Denies  Medication Trials: Recent trial of Pristiq, she was doing 50 mg every other day and seem to be doing okay on it.  Primary care wrote for 25 mg daily and she reports she just has side effects with the medication and stopped taking it. Qelbree samples for 2 weeks was deemed ineffective. Vyvanse was trialed many years ago was deemed to be successful, highest dose was 60 mg daily.  Trintellix samples gave her a lot of nausea she lost 5 pounds, also gave her urinary retention.  Strattera was a good medicine for the attention deficit but she had severe urinary retention with this and had to stop taking it for that reason.  Trial of methylphenidate in the form of Concerta was deemed ineffective.  Generic Adderall was not very effective, and the higher doses gave her anxiety.  Lexapro gave significant weight gain although it was helpful for her symptoms.  Trial of Effexor she does not remember the results.  Trial of Zoloft reviewed reportedly gave her a lot of sedation even at the lowest dose of 25 mg.  She was on Wellbutrin for an extended length of time in the past.  Self Harm: Denies  Suicide Attempts:Denies   Psychosis, Anxiety, Depression:  Some brief mood changes after each pregnancy.    Past Medical History:  Past Medical History:   Diagnosis Date    ADHD (attention deficit hyperactivity disorder)     Anxiety     Depression     Situational in the past    Disease of thyroid gland     Multinodular goitor    Factor 5 Leiden mutation, heterozygous     H/O thyroidectomy 2024    Headache     Neuropathy 2024    Papillary thyroid carcinoma 2023       Substance Abuse History:   Types:Denies all, including illicit      Social History:  Social History     Socioeconomic History    Marital status:      Number of children: 4    Highest education level: Master's degree (e.g., MA, MS, Rafia, MEd, MSW, GAVI)   Tobacco Use    Smoking status: Never    Smokeless tobacco: Never   Substance and Sexual Activity    Alcohol use: No    Drug use: No    Sexual activity: Yes     Partners: Male     Birth control/protection: Tubal ligation, Surgical     Comment: TUBAL    Patient owns her own business.  Denies any history of  service, denies any legal problems.  Spouse is very supportive.  Endorses Catholic elisha.  Adopted her sister's male child at the child's birth.    Family History:  Family History   Problem Relation Age of Onset    Alcohol abuse Mother         Recovering    Hypertension Mother     Hyperlipidemia Mother     Aortic aneurysm Mother     Depression Mother     Parkinsonism Father     Ki Parkinson White syndrome Father     Hemochromatosis Father     OCD Sister     Drug abuse Sister     ADD / ADHD Son     Breast cancer Paternal Grandmother         DX AGE POST MENOPAUSAL    Breast cancer Paternal Aunt         DX AGE 40'S    Breast cancer Paternal Aunt         DX AGE 40'S    Breast cancer Maternal Cousin 46    Breast cancer Paternal Cousin 46    Ovarian cancer Neg Hx        Past Surgical History:  Past Surgical History:   Procedure Laterality Date    ABDOMINAL SURGERY  , ,     C/S    AUGMENTATION MAMMAPLASTY Bilateral 2009    SILICONE , retropectoral     SECTION      three total    D & C HYSTEROSCOPY ENDOMETRIAL ABLATION N/A 2017    Procedure: DILATATION AND CURETTAGE HYSTEROSCOPY NOVASURE ENDOMETRIAL ABLATION;  Surgeon: Toshia Morrell DO;  Location: [facility] OR;  Service:     HERNIA REPAIR  2023    Right inguinal    TUBAL ABDOMINAL LIGATION         Problem List:  Patient Active Problem List   Diagnosis    Migraine without aura and without status migrainosus, not intractable    Restless leg syndrome    Attention deficit disorder of adult    Mixed anxiety depressive  disorder    Factor V Leiden mutation    Non-toxic multinodular goiter    Bilateral hearing loss    Papillary thyroid carcinoma    Neuropathy    Postoperative hypothyroidism       Allergy:   Allergies   Allergen Reactions    Atomoxetine Other (See Comments)     Urinary retention    Strattera [Atomoxetine Hcl] Urinary Retention    Nickel Rash     Skin reaction        Current Medications:   Current Outpatient Medications   Medication Sig Dispense Refill    levothyroxine (SYNTHROID, LEVOTHROID) 112 MCG tablet Take one tablet daily by mouth 90 tablet 1    Progesterone (PROMETRIUM) 100 MG capsule Take 2 capsules by mouth every night at bedtime.      SUMAtriptan (IMITREX) 100 MG tablet Take 1 tablet by mouth Every 2 (Two) Hours As Needed for Migraine. 9 tablet 5    amphetamine-dextroamphetamine XR (ADDERALL XR) 25 MG 24 hr capsule Take 1 capsule by mouth Every Morning 30 capsule 0     No current facility-administered medications for this visit.       Review of Systems:    Review of Systems   All other systems reviewed and are negative.        Physical Exam:   Physical Exam  Vitals reviewed.   Constitutional:       Appearance: Normal appearance. She is well-developed and well-groomed.   HENT:      Head: Normocephalic.   Neurological:      Mental Status: She is alert.   Psychiatric:         Attention and Perception: Attention and perception normal.         Mood and Affect: Mood and affect normal.         Speech: Speech normal.         Behavior: Behavior normal. Behavior is cooperative.         Thought Content: Thought content normal.         Cognition and Memory: Cognition and memory normal.         Judgment: Judgment normal.         Vitals:  not currently breastfeeding. There is no height or weight on file to calculate BMI.  Due to extenuating circumstances and possible current health risks associated with the patient being present in a clinical setting (with current health restrictions in place in regards to possible COVID  19 transmission/exposure), the patient was seen remotely today via a MyChart Video Visit through [facility] and telephone encounter.  Unable to obtain vital signs due to nature of remote visit.  Height stated at 70 inches.  Weight stated at 171 pounds.    Last 3 Blood Pressure Readings:  BP Readings from Last 3 Encounters:   03/06/24 138/72   01/12/24 130/70   11/29/23 132/70       Mental Status Exam:   Hygiene:   good  Cooperation:  Cooperative  Eye Contact:  Good  Psychomotor Behavior:  Appropriate  Affect:  Full range  Mood: euthymic  Hopelessness: Denies  Speech:  Normal  Thought Process:  Goal directed and Linear  Thought Content:  Normal  Suicidal:  None  Homicidal:  None  Hallucinations:  None  Delusion:  None  Memory:  Intact  Orientation:  Person, Place, Time and Situation  Reliability:  good  Insight:  Good  Judgement:  Good  Impulse Control:  Good  Physical/Medical Issues:  No        Lab Results:        No visits with results within 1 Month(s) from this visit.   Latest known visit with results is:   Orders Only on 04/26/2024   Component Date Value Ref Range Status    Free T4 04/26/2024 1.50  0.82 - 1.77 ng/dL Final    TSH 04/26/2024 0.369 (L)  0.450 - 4.500 uIU/mL Final    Thyroglobulin Ab 04/26/2024 <1.0  0.0 - 0.9 IU/mL Final    Comment: Thyroglobulin Antibody measured by [facility] Nak[facility] Methodology  It should be noted that the presence of thyroglobulin antibodies  may not be pathogenic nor diagnostic, especially at very low  levels. The assay  has found that four percent of  individuals without evidence of thyroid disease or autoimmunity  will have positive TgAb levels up to 4 IU/mL.      THYROGLOBULIN BY NASRIN 04/26/2024 1.6  1.5 - 38.5 ng/mL Final    Comment: According to the National Academy of Clinical Biochemistry, the  reference interval for Thyroglobulin (TG) should be related to  euthyroid patients and not for patients who underwent thyroidectomy.  TG reference intervals for these patients  "depend on the residual  mass of the thyroid tissue left after surgery. Establishing a  post-operative baseline is recommended.  The assay limit of quantitation is 0.1 ng/mL  Thyroglobulin measured by [facility]ca Cecelia Immunometric Assay           Assessment & Plan   Diagnoses and all orders for this visit:    1. ADHD (attention deficit hyperactivity disorder), combined type (Primary)  -     amphetamine-dextroamphetamine XR (ADDERALL XR) 25 MG 24 hr capsule; Take 1 capsule by mouth Every Morning  Dispense: 30 capsule; Refill: 0          Visit Diagnoses:    ICD-10-CM ICD-9-CM   1. ADHD (attention deficit hyperactivity disorder), combined type  F90.2 314.01         GOALS:  Short Term Goals: Patient will be compliant with medication, and patient will have no significant medication related side effects.  Patient will be engaged in psychotherapy as indicated.  Patient will report subjective improvement of symptoms.  Long term goals: To stabilize mood and treat/improve subjective symptoms, the patient will stay out of the hospital, the patient will be at an optimal level of functioning, and the patient will take all medications as prescribed.  The patient/guardian verbalized understanding and agreement with goals that were mutually set.    RISK ASSESSMENT  Current harm-to-self risk is reported by pt as \"none.\"  Current [id number] risk is reported by pt as \"none.\"   No suicidal thoughts, intent, plan is appreciated by this provider on this date of exam.   No homicidal thoughts, intent, plan is appreciated by this provider on this date.    TREATMENT PLAN: Continue supportive psychotherapy efforts and medications as indicated.  Pharmacological and Non-Pharmacological treatment options discussed during today's visit. Patient/Guardian acknowledged and verbally consented with current treatment plan and was educated on the importance of compliance with treatment and follow-up appointments.      MEDICATION ISSUES:  Discussed " medication options and treatment plan of prescribed medication as well as the risks, benefits, any black box warnings, and side effects including potential falls, possible impaired driving, and metabolic adversities among others. Patient is agreeable to call the office with any worsening of symptoms or onset of side effects, or if any concerns or questions arise.  The contact information for the office is made available to the patient. Patient is agreeable to call 911 or go to the nearest ER should they begin having any SI/HI, or if any urgent concerns arise.      START Adderall xr 25mg capsule every morning   Call one week prior to needing refill        MEDS ORDERED DURING VISIT:  New Medications Ordered This Visit   Medications    amphetamine-dextroamphetamine XR (ADDERALL XR) 25 MG 24 hr capsule     Sig: Take 1 capsule by mouth Every Morning     Dispense:  30 capsule     Refill:  0        Follow Up Appointment:   Return in about 12 weeks (around 10/15/2024) for Recheck, Video visit.                  This document has been electronically signed by YOMAIRA Hayden  July 24, 2024 17:44 EDT    Dictated Utilizing Dragon Dictation: Part of this note may be an electronic transcription/translation of spoken language to printed text using the Dragon Dictation System.

## 2024-07-24 NOTE — PATIENT INSTRUCTIONS
For concerns or needing assistance call the [facility] at [phone]  Medication refills:- Refill requests are addressed M-Th. No refills will be sent in on Fridays, weekends or federal holidays.   Please be aware of your need for refills and call one week before needing medication refilled so it can be filled in a timely manner.   START Adderall xr 25mg capsule every morning

## 2024-08-26 DIAGNOSIS — F90.2 ADHD (ATTENTION DEFICIT HYPERACTIVITY DISORDER), COMBINED TYPE: ICD-10-CM

## 2024-08-26 RX ORDER — DEXTROAMPHETAMINE SACCHARATE, AMPHETAMINE ASPARTATE MONOHYDRATE, DEXTROAMPHETAMINE SULFATE AND AMPHETAMINE SULFATE 6.25; 6.25; 6.25; 6.25 MG/1; MG/1; MG/1; MG/1
25 CAPSULE, EXTENDED RELEASE ORAL EVERY MORNING
Qty: 30 CAPSULE | Refills: 0 | Status: SHIPPED | OUTPATIENT
Start: 2024-08-26

## 2024-09-03 DIAGNOSIS — E89.0 POSTOPERATIVE HYPOTHYROIDISM: ICD-10-CM

## 2024-09-03 RX ORDER — LEVOTHYROXINE SODIUM 112 UG/1
TABLET ORAL
Qty: 30 TABLET | Refills: 0 | Status: SHIPPED | OUTPATIENT
Start: 2024-09-03

## 2024-09-03 NOTE — TELEPHONE ENCOUNTER
Rx Refill Note  Requested Prescriptions     Pending Prescriptions Disp Refills    levothyroxine (SYNTHROID, LEVOTHROID) 112 MCG tablet 30 tablet 0     Sig: Take one tablet daily by mouth      Last office visit with prescribing clinician: 3/6/2024     Next office visit with prescribing clinician: 9/11/2024     Autumn Rosenbaum MA  09/03/24, 09:45 EDT

## 2024-09-04 NOTE — PROGRESS NOTES
Chief complaint/Reason for consult: PTC    HPI: Ms. Whitehead is a 48-year-old female with history of papillary thyroid carcinoma status post resection who comes for follow-up of hypothyroidism with history of thyroid cancer. She was last seen 3/6/2024 and reports since that time feeling well.     Surgery date: Initial lobectomy done 8/3/2023, completion thyroidectomy done 11/1/2023 at [facility] in [place], [place]  Tumor size: Multifocal papillary thyroid carcinoma with 2 foci of cancer detected in the right thyroid lobe, the largest 0.4 cm and the smallest is less than 0.1 cm and left lobe with 0.03 cm papillary carcinoma, follicular variant, encapsulated, well-demarcated, noninvasive.   Lymph node status: 3 benign perithyroidal lymph nodes (Level VI)  No tumor necrosis identified  No extrathyroidal invasion   Negative surgical margins  I-131 was not done   No distant mets   TNM classification [id number]  AJCC stage 1  BRUCE risk: Low risk  Indeterminate/incomplete response to treatment due to nonstimulated Tg above 0.2  TSH goal 0.1-0.5      Follow-up imaging and labs    11/29/2023  TSH 0.339  FT4 1.47  Tg < 2.0   TgAb not checked      2/16/2024  TSH 0.157  FT4 1.27     4/12/2024   Neck ultrasound done with no sonographic evidence of disease recurrence within the neck, there is a small subcentimeter cyst in the left submandibular gland     4/26/2024   TSH 0.369  free T4 1.50  TgAb < 1.0   Tg 1.6     # Post surgical hypothyroidism   -Currently taking levothyroxine 112 mcg daily with good compliance with  -Reports feeling very well on this dose, paresthesias have resolved since lowering the dose slightly    Past medical history, past surgical history, family history and social history reviewed within this encounter.     Review of Systems   Constitutional:  Negative for activity change and unexpected weight change.   HENT:  Negative for trouble swallowing and voice change.    Eyes:  Negative for visual disturbance.  "  Respiratory:  Negative for shortness of breath.    Cardiovascular:  Negative for chest pain.   Gastrointestinal:  Negative for abdominal pain.   Endocrine: Negative for cold intolerance and heat intolerance.   Musculoskeletal:  Negative for gait problem.   Skin:  Negative for rash.   Neurological:  Negative for numbness.   Psychiatric/Behavioral:  Negative for agitation and confusion.         /64   Pulse 76   Resp 20   Ht 177.8 cm (70\")   Wt 73.9 kg (163 lb)   SpO2 97%   BMI 23.39 kg/m²      Physical Exam  Vitals reviewed.   Constitutional:       Appearance: She is normal weight.   Eyes:      Conjunctiva/sclera: Conjunctivae normal.   Neck:      Comments: No palpable thyroid tissue or lymphadenopathy  Cardiovascular:      Rate and Rhythm: Normal rate.      Pulses: Normal pulses.   Pulmonary:      Effort: Pulmonary effort is normal.   Abdominal:      Tenderness: There is no guarding.   Musculoskeletal:         General: No deformity.      Cervical back: Neck supple.   Skin:     General: Skin is warm and dry.   Neurological:      Mental Status: She is alert and oriented to person, place, and time.   Psychiatric:         Mood and Affect: Mood normal.         Behavior: Behavior normal.          Labs and images reviewed as noted in the HPI    Assessment and plan:    Diagnoses and all orders for this visit:    1. Postoperative hypothyroidism (Primary)  Assessment & Plan:  -Patient had low risk thyroid cancer with indeterminate response to treatment  -Goal TSH around 0.1-0.5  -Recommend continuing levothyroxine to 112 mcg daily        Orders:  -     TSH; Future  -     T4, Free; Future  -     Thyroglobulin Antibody and Thyroglobulin, NASRIN or LC/MS-MS; Future  -     Thyroglobulin Antibody and Thyroglobulin, NASRIN or LC/MS-MS  -     T4, Free  -     TSH  -     levothyroxine (SYNTHROID, LEVOTHROID) 112 MCG tablet; Take one tablet daily by mouth  Dispense: 90 tablet; Refill: 3    2. Papillary thyroid carcinoma  -     " TSH; Future  -     T4, Free; Future  -     Thyroglobulin Antibody and Thyroglobulin, NASRIN or LC/MS-MS; Future  -     Thyroglobulin Antibody and Thyroglobulin, NASRIN or LC/MS-MS  -     T4, Free  -     TSH  -     US Head Neck Soft Tissue; Future    Surgery date: Initial lobectomy done 8/3/2023, completion thyroidectomy done 11/1/2023 at [facility] in [place], [place]  Tumor size: Multifocal papillary thyroid carcinoma with 2 foci of cancer detected in the right thyroid lobe, the largest 0.4 cm and the smallest is less than 0.1 cm and left lobe with 0.03 cm papillary carcinoma, follicular variant, encapsulated, well-demarcated, noninvasive.   Lymph node status: 3 benign perithyroidal lymph nodes (Level VI)  No tumor necrosis identified  No extrathyroidal invasion   Negative surgical margins  I-131 was not done   No distant mets   TNM classification [id number]  AJCC stage 1  BRUCE risk: Low risk  Indeterminate/incomplete response to treatment due to nonstimulated Tg above 0.2  TSH goal 0.1-0.5      Follow-up imaging and labs    11/29/2023  TSH 0.339  FT4 1.47  Tg < 2.0   TgAb not checked      2/16/2024  TSH 0.157  FT4 1.27     4/12/2024   Neck ultrasound done with no sonographic evidence of disease recurrence within the neck, there is a small subcentimeter cyst in the left submandibular gland     4/26/2024   TSH 0.369  free T4 1.50  TgAb < 1.0   Tg 1.6     Check head/neck ultrasound prior to next visit  Check TSH, free T4, Tg and TgAb in coming weeks, patient is self-pay and would like to get labs done through [facility] directly as it is cheaper, she has an order    Return in about 8 months (around 5/11/2025) for Hypothyroidism/PTC.       Please note that portions of this note may have been completed with a voice recognition program. Efforts were made to edit the dictations, but occasionally words are mistranscribed.     Electronically signed by: Kyle S Rosenstein, MD

## 2024-09-11 ENCOUNTER — OFFICE VISIT (OUTPATIENT)
Dept: ENDOCRINOLOGY | Facility: CLINIC | Age: 49
End: 2024-09-11

## 2024-09-11 VITALS
SYSTOLIC BLOOD PRESSURE: 122 MMHG | DIASTOLIC BLOOD PRESSURE: 64 MMHG | BODY MASS INDEX: 23.34 KG/M2 | WEIGHT: 163 LBS | OXYGEN SATURATION: 97 % | RESPIRATION RATE: 20 BRPM | HEART RATE: 76 BPM | HEIGHT: 70 IN

## 2024-09-11 DIAGNOSIS — E89.0 POSTOPERATIVE HYPOTHYROIDISM: Primary | ICD-10-CM

## 2024-09-11 DIAGNOSIS — C73 PAPILLARY THYROID CARCINOMA: ICD-10-CM

## 2024-09-11 RX ORDER — LEVOTHYROXINE SODIUM 112 UG/1
TABLET ORAL
Qty: 90 TABLET | Refills: 3 | Status: SHIPPED | OUTPATIENT
Start: 2024-09-11

## 2024-09-11 NOTE — ASSESSMENT & PLAN NOTE
-Patient had low risk thyroid cancer with indeterminate response to treatment  -Goal TSH around 0.1-0.5  -Recommend continuing levothyroxine to 112 mcg daily

## 2024-09-20 DIAGNOSIS — E89.0 POSTOPERATIVE HYPOTHYROIDISM: Primary | ICD-10-CM

## 2024-10-02 DIAGNOSIS — F90.2 ADHD (ATTENTION DEFICIT HYPERACTIVITY DISORDER), COMBINED TYPE: ICD-10-CM

## 2024-10-02 RX ORDER — DEXTROAMPHETAMINE SACCHARATE, AMPHETAMINE ASPARTATE MONOHYDRATE, DEXTROAMPHETAMINE SULFATE AND AMPHETAMINE SULFATE 6.25; 6.25; 6.25; 6.25 MG/1; MG/1; MG/1; MG/1
25 CAPSULE, EXTENDED RELEASE ORAL EVERY MORNING
Qty: 30 CAPSULE | Refills: 0 | Status: SHIPPED | OUTPATIENT
Start: 2024-10-02

## 2024-11-05 ENCOUNTER — TELEPHONE (OUTPATIENT)
Dept: ENDOCRINOLOGY | Facility: CLINIC | Age: 49
End: 2024-11-05

## 2024-11-05 DIAGNOSIS — E89.0 POSTOPERATIVE HYPOTHYROIDISM: Primary | ICD-10-CM

## 2024-11-05 NOTE — TELEPHONE ENCOUNTER
----- Message from Kyle Rosenstein sent at 11/5/2024  2:18 PM EST -----  Regarding: Mail TSH  Could you mail her the lab order for a TSH I just placed?

## 2024-11-13 ENCOUNTER — TRANSCRIBE ORDERS (OUTPATIENT)
Dept: ADMINISTRATIVE | Facility: HOSPITAL | Age: 49
End: 2024-11-13

## 2024-11-13 DIAGNOSIS — Z12.31 SCREENING MAMMOGRAM FOR BREAST CANCER: Primary | ICD-10-CM

## 2024-12-13 LAB
NCCN CRITERIA FLAG: ABNORMAL
TYRER CUZICK SCORE: 14.7

## 2024-12-20 ENCOUNTER — HOSPITAL ENCOUNTER (OUTPATIENT)
Dept: MAMMOGRAPHY | Facility: HOSPITAL | Age: 49
Discharge: HOME | End: 2024-12-20
Admitting: NURSE PRACTITIONER
Payer: MEDICAID

## 2024-12-20 DIAGNOSIS — Z12.31 SCREENING MAMMOGRAM FOR BREAST CANCER: ICD-10-CM

## 2024-12-20 PROCEDURE — 77063 BREAST TOMOSYNTHESIS BI: CPT

## 2024-12-20 PROCEDURE — 77067 SCR MAMMO BI INCL CAD: CPT

## 2024-12-23 DIAGNOSIS — Z13.79 GENETIC TESTING: Primary | ICD-10-CM

## 2025-01-29 ENCOUNTER — LAB (OUTPATIENT)
Dept: LAB | Facility: HOSPITAL | Age: 50
End: 2025-01-29
Payer: COMMERCIAL

## 2025-01-29 DIAGNOSIS — Z13.79 GENETIC TESTING: ICD-10-CM

## 2025-01-29 PROCEDURE — 36415 COLL VENOUS BLD VENIPUNCTURE: CPT

## 2025-02-13 LAB
AMBRY INTERPRETATION REPORT: NORMAL
GENE DIS ANL INTERP-IMP: NORMAL

## 2025-03-27 ENCOUNTER — TELEMEDICINE (OUTPATIENT)
Dept: PSYCHIATRY | Facility: CLINIC | Age: 50
End: 2025-03-27
Payer: COMMERCIAL

## 2025-03-27 DIAGNOSIS — F90.2 ADHD (ATTENTION DEFICIT HYPERACTIVITY DISORDER), COMBINED TYPE: Primary | ICD-10-CM

## 2025-03-27 DIAGNOSIS — Z79.899 ENCOUNTER FOR LONG-TERM (CURRENT) USE OF MEDICATIONS: ICD-10-CM

## 2025-03-27 DIAGNOSIS — F33.0 MAJOR DEPRESSIVE DISORDER, RECURRENT EPISODE, MILD WITH SEASONAL PATTERN: ICD-10-CM

## 2025-03-27 RX ORDER — LISDEXAMFETAMINE DIMESYLATE 30 MG/1
30 CAPSULE ORAL EVERY MORNING
Qty: 30 CAPSULE | Refills: 0 | Status: SHIPPED | OUTPATIENT
Start: 2025-03-27

## 2025-03-27 RX ORDER — ESCITALOPRAM OXALATE 5 MG/1
TABLET ORAL
Qty: 30 TABLET | Refills: 0 | Status: SHIPPED | OUTPATIENT
Start: 2025-03-27

## 2025-03-27 NOTE — PROGRESS NOTES
" Mode of Visit: Video  Location of patient: -HOME-  Location of provider: +HOME+  You have chosen to receive care through a telehealth visit.  The patient has signed the video visit consent form.  The visit included audio and video interaction. No technical issues occurred during this visit.  Patient verbally confirmed consent for today's encounter  03/27/2025      Subjective   Pooja Whitehead is a 49 y.o. female who presents today for follow up    Chief Complaint:  \" adhd, depression\"     History of Present Illness:    History of Present Illness  Last encounter with undersigned was in July 2024.  At that encounter we were working with the Adderall, most recent prescription was sent in in the fall 2024 for Adderall XR.  Patient reports she has been struggling with seasonal depressive symptoms.  She has tried some over-the-counter options including saffron and L-theanine for the last 2 months.  Prior to that she tried SAMe, and ashwagandha.  But the ashwagandha upset her stomach.  Reviewed psychiatric history as far as medications for depressive symptoms.  Out of everything she has tried the Lexapro was most effective for symptomology however she had quite a bit of weight gain with this medication.  Patient reports her estrogen has not been level, and this has played a role.  She reports she has been isolating more, on the weekend she does not want to get up and get cleaned up.  She does try to go to [employer] on Sundays.  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  She finds it difficult to go out and go to the grocery store.  Motivation in general is just not there.  She also reports she has not been exercising even though she has a desire to do this she just does not have the motivation to do it.  PHQ-9 score today is 5, anxiety screen is 5.  Patient does have insurance now, and so I [facility] much better [facility] from [facility].  She " does reports she had some of the Adderall XR left from last prescription and has been using that some and it does help but not as good as the Vyvanse.  Patient agreeable to urine drug screen and wants to go to [facility] in [facility].  Order was placed and staff was notified to send this to [facility].  Patient reports she can go get this done tomorrow.  I expect it will have some positive results for amphetamines due to patient reporting taking the Adderall.  Discussed getting a better quality of the saffron, going to Dr. Bubba Arenas's site, brain [facility] to order from there.            PHQ-9 Depression Screening  Little interest or pleasure in doing things? Several days   Feeling down, depressed, or hopeless? Several days   PHQ-2 Total Score 2   Trouble falling or staying asleep, or sleeping too much? Several days   Feeling tired or having little energy? Several days   Poor appetite or overeating? Not at all   Feeling bad about yourself - or that you are a failure or have let yourself or your family down? Not at all   Trouble concentrating on things, such as reading the newspaper or watching television? Several days   Moving or speaking so slowly that other people could have noticed? Or the opposite - being so fidgety or restless that you have been moving around a lot more than usual? Not at all     Thoughts that you would be better off dead, or of hurting yourself in some way? Not at all   PHQ-9 Total Score 5   If you checked off any problems, how difficult have these problems made it for you to do your work, take care of things at home, or get along with other people? Somewhat difficult             SHELLI-7  Feeling nervous, anxious or on edge: (Patient-Rptd) Several days  Not being able to stop or control worrying: (Patient-Rptd) Several days  Worrying too much about different things: (Patient-Rptd) Several days  Trouble Relaxing: (Patient-Rptd) Several days  Being so restless that it is hard to sit still:  (Patient-Rptd) Not at all  Feeling afraid as if something awful might happen: (Patient-Rptd) Not at all  Becoming easily annoyed or irritable: (Patient-Rptd) Several days  SHELLI 7 Total Score: (Patient-Rptd) 5  If you checked any problems, how difficult have these problems made it for you to do your work, take care of things at home, or get along with other people: (Patient-Rptd) Somewhat difficult     The following portions of the patient's history were reviewed and updated as appropriate: allergies, current medications, past family history, past medical history, past social history, past surgical history and problem list.    Past Psychiatric History:  Began Treatment: Several years ago  Diagnoses: Diagnosed with ADHD as a child, not treated medically.  Diagnosed with depression and anxiety several years ago.   Psychiatrist:Jillian  Therapist: Brief marriage counseling in the past with her first marriage she did not find this effective.  Admission History:Denies  Medication Trials:  Pristiq, she was doing 50 mg every other day and seem to be doing okay on it.  Primary care wrote for 25 mg daily and she reports she just has side effects with the medication and stopped taking it. Qelbree samples for 2 weeks was deemed ineffective. Vyvanse was trialed many years ago was deemed to be successful, highest dose was 60 mg daily.  Trintellix samples gave her a lot of nausea she lost 5 pounds, also gave her urinary retention.  Strattera was a good medicine for the attention deficit but she had severe urinary retention with this and had to stop taking it for that reason.  Trial of methylphenidate in the form of Concerta was deemed ineffective.  Generic Adderall was not very effective, and the higher doses gave her anxiety.  Lexapro gave significant weight gain although it was helpful for her depressive symptoms.  Patient reports Prozac at 10 mg was very sedating.  She reports she also had urinary retention with Celexa.  Trial of  Effexor she does not remember the results.  Trial of Zoloft reviewed reportedly gave her a lot of sedation even at the lowest dose of 25 mg.  She was on Wellbutrin for an extended length of time in the past for attention deficit.  Self Harm: Denies  Suicide Attempts:Denies   Psychosis, Anxiety, Depression:  Some brief mood changes after each pregnancy.    Past Medical History:  Past Medical History:   Diagnosis Date    ADHD (attention deficit hyperactivity disorder)     Anxiety     Depression     Situational in the past    Disease of thyroid gland     Multinodular goitor    Factor 5 Leiden mutation, heterozygous     H/O thyroidectomy 2024    Headache     Neuropathy 2024    Papillary thyroid carcinoma 2023       Substance Abuse History:   Types:Denies all, including illicit      Social History:  Social History     Socioeconomic History    Marital status:     Number of children: 4    Highest education level: Master's degree (e.g., MA, MS, Rafia, MEd, MSW, GAVI)   Tobacco Use    Smoking status: Never    Smokeless tobacco: Never   Vaping Use    Vaping status: Never Used   Substance and Sexual Activity    Alcohol use: No    Drug use: No    Sexual activity: Yes     Partners: Male     Birth control/protection: Tubal ligation, Surgical     Comment: TUBAL    Patient owns her own business.  Denies any history of  service, denies any legal problems.  Spouse is very supportive.  Endorses Religion elisha.  Adopted her sister's male child at the child's birth.    Family History:  Family History   Problem Relation Age of Onset    Alcohol abuse Mother         Recovering    Hypertension Mother     Hyperlipidemia Mother     Aortic aneurysm Mother     Depression Mother     Parkinsonism Father     Ki Parkinson White syndrome Father     Hemochromatosis Father     OCD Sister     Drug abuse Sister     ADD / ADHD Son     Breast cancer Paternal Grandmother         DX AGE POST MENOPAUSAL    Breast  cancer Paternal Aunt         DX AGE 40'S    Breast cancer Paternal Aunt         DX AGE 40'S    Breast cancer Paternal Cousin 46    Ovarian cancer Neg Hx        Past Surgical History:  Past Surgical History:   Procedure Laterality Date    ABDOMINAL SURGERY  , ,     C/S    AUGMENTATION MAMMAPLASTY Bilateral 2009    SILICONE , retropectoral     SECTION      three total    D & C HYSTEROSCOPY ENDOMETRIAL ABLATION N/A 2017    Procedure: DILATATION AND CURETTAGE HYSTEROSCOPY NOVASURE ENDOMETRIAL ABLATION;  Surgeon: Toshia Morrell DO;  [facility];  Service:     HERNIA REPAIR  2023    Right inguinal    TUBAL ABDOMINAL LIGATION         Problem List:  Patient Active Problem List   Diagnosis    Migraine without aura and without status migrainosus, not intractable    Restless leg syndrome    Attention deficit disorder of adult    Mixed anxiety depressive disorder    Factor V Leiden mutation    Bilateral hearing loss    Papillary thyroid carcinoma    Neuropathy    Postoperative hypothyroidism       Allergy:   Allergies   Allergen Reactions    Atomoxetine Other (See Comments)     Urinary retention    Strattera [Atomoxetine Hcl] Urinary Retention    Nickel Rash     Skin reaction        Current Medications:   Current Outpatient Medications   Medication Sig Dispense Refill    levothyroxine (SYNTHROID, LEVOTHROID) 112 MCG tablet Take 1 tablet by mouth 6 days per week and 1.5 tablets by mount 1 day per week 98 tablet 3    Progesterone (PROMETRIUM) 100 MG capsule Take 2 capsules by mouth every night at bedtime.      Testosterone 25 MG pellet 12.5 mg by Other route. (Patient taking differently: 12.5 mg by Other route. Taking 4mg daily in a cream form)      escitalopram (Lexapro) 5 MG tablet Take one oral tablet daily with food 30 tablet 0    lisdexamfetamine (Vyvanse) 30 MG capsule Take 1 capsule by mouth Every Morning 30 capsule 0    SUMAtriptan (IMITREX) 100 MG tablet Take 1 tablet by mouth  Every 2 (Two) Hours As Needed for Migraine. 9 tablet 5     No current facility-administered medications for this visit.       Review of Systems:    Review of Systems   Constitutional:  Positive for activity change and fatigue.   Psychiatric/Behavioral:  Positive for decreased concentration, depressed mood and stress.    All other systems reviewed and are negative.        Physical Exam:   Physical Exam  Vitals reviewed.   Constitutional:       Appearance: Normal appearance. She is well-developed and well-groomed.   HENT:      Head: Normocephalic.   Neurological:      Mental Status: She is alert.   Psychiatric:         Attention and Perception: Attention and perception normal.         Mood and Affect: Mood normal. Mood is depressed.         Speech: Speech normal.         Behavior: Behavior normal. Behavior is cooperative.         Thought Content: Thought content normal.      Comments: Affect somewhat tearful when discussing current symptomology         Vitals:  not currently breastfeeding. There is no height or weight on file to calculate BMI.  Due to extenuating circumstances and possible current health risks associated with the patient being present in a clinical setting (with current health restrictions in place in regards to possible COVID 19 transmission/exposure), the patient was seen remotely today via a MyChart Video Visit through [facility] and telephone encounter.  Unable to obtain vital signs due to nature of remote visit.  Height stated at 70 inches.  Weight stated at 160 pounds.    Last 3 Blood Pressure Readings:  BP Readings from Last 3 Encounters:   09/11/24 122/64   03/06/24 138/72   01/12/24 130/70       Mental Status Exam:   Hygiene:   good  Cooperation:  Cooperative  Eye Contact:  Good  Psychomotor Behavior:  Appropriate  Affect:  Appropriate and somewhat tearful when discussing symptomology  Mood: sad and depressed  Hopelessness: Denies  Speech:  Normal  Thought Process:  Goal directed and  Linear  Thought Content:  Normal  Suicidal:  None  Homicidal:  None  Hallucinations:  None  Delusion:  None  Memory:  Intact  Orientation:  Person, Place, Time and Situation  Reliability:  good  Insight:  Good  Judgement:  Good  Impulse Control:  Good  Physical/Medical Issues:  No        Lab Results:        No visits with results within 1 Month(s) from this visit.   Latest known visit with results is:   Lab on 01/29/2025   Component Date Value Ref Range Status    Overall Interpretation 01/29/2025 NEGATIVE: No Clinically Significant Variants Detected   Final    Interpretation Report 01/29/2025 See Notes^^LN   Final    [facility] Report         Assessment & Plan   Diagnoses and all orders for this visit:    1. ADHD (attention deficit hyperactivity disorder), combined type (Primary)  -     lisdexamfetamine (Vyvanse) 30 MG capsule; Take 1 capsule by mouth Every Morning  Dispense: 30 capsule; Refill: 0    2. Major depressive disorder, recurrent episode, mild with seasonal pattern  -     escitalopram (Lexapro) 5 MG tablet; Take one oral tablet daily with food  Dispense: 30 tablet; Refill: 0    3. Encounter for long-term (current) use of medications  -     Urine Drug Screen - Urine, Clean Catch; Future            Visit Diagnoses:    ICD-10-CM ICD-9-CM   1. ADHD (attention deficit hyperactivity disorder), combined type  F90.2 314.01   2. Major depressive disorder, recurrent episode, mild with seasonal pattern  F33.0 296.31   3. Encounter for long-term (current) use of medications  Z79.899 V58.69           GOALS:  Short Term Goals: Patient will be compliant with medication, and patient will have no significant medication related side effects.  Patient will be engaged in psychotherapy as indicated.  Patient will report subjective improvement of symptoms.  Long term goals: To stabilize mood and treat/improve subjective symptoms, the patient will stay out of the hospital, the patient will be at an optimal level of functioning, and  "the patient will take all medications as prescribed.  The patient/guardian verbalized understanding and agreement with goals that were mutually set.    RISK ASSESSMENT  Current harm-to-self risk is reported by pt as \"none.\"  Current [id number] risk is reported by pt as \"none.\"   No suicidal thoughts, intent, plan is appreciated by this provider on this date of exam.   No homicidal thoughts, intent, plan is appreciated by this provider on this date.    TREATMENT PLAN: Continue supportive psychotherapy efforts and medications as indicated.  Pharmacological and Non-Pharmacological treatment options discussed during today's visit. Patient/Guardian acknowledged and verbally consented with current treatment plan and was educated on the importance of compliance with treatment and follow-up appointments.      MEDICATION ISSUES:  Discussed medication options and treatment plan of prescribed medication as well as the risks, benefits, any black box warnings, and side effects including potential falls, possible impaired driving, and metabolic adversities among others. Patient is agreeable to call the office with any worsening of symptoms or onset of side effects, or if any concerns or questions arise.  The contact information for the office is made available to the patient. Patient is agreeable to call 911 or go to the nearest ER should they begin having any SI/HI, or if any urgent concerns arise.      Start Vyvanse 30 mg every morning, hopefully insurance will cover this may need a prior authorization.  Start Lexapro 5 mg tablet daily with food  [facility] saffron  Go to [facility] for urine drug screen       MEDS ORDERED DURING VISIT:  New Medications Ordered This Visit   Medications    lisdexamfetamine (Vyvanse) 30 MG capsule     Sig: Take 1 capsule by mouth Every Morning     Dispense:  30 capsule     Refill:  0    escitalopram (Lexapro) 5 MG tablet     Sig: Take one oral tablet daily with food     " Dispense:  30 tablet     Refill:  0        Follow Up Appointment:   Return in about 1 month (around 4/29/2025) for Recheck, Video visit.                  This document has been electronically signed by YOMAIRA Hayden  March 27, 2025 09:32 EDT    Dictated Utilizing Dragon Dictation: Part of this note may be an electronic transcription/translation of spoken language to printed text using the Dragon Dictation System.

## 2025-03-27 NOTE — PATIENT INSTRUCTIONS
For concerns or needing assistance call the [facility] at [phone]  Start Vyvanse 30 mg every morning, hopefully insurance will cover this may need a prior authorization.  Start Lexapro 5 mg tablet daily with food  Go to [facility] to order happy saffron  Go to [facility] for UDS

## 2025-04-01 ENCOUNTER — PRIOR AUTHORIZATION (OUTPATIENT)
Dept: PSYCHIATRY | Facility: CLINIC | Age: 50
End: 2025-04-01
Payer: COMMERCIAL

## 2025-04-25 ENCOUNTER — HOSPITAL ENCOUNTER (OUTPATIENT)
Dept: ULTRASOUND IMAGING | Facility: HOSPITAL | Age: 50
Discharge: HOME | End: 2025-04-25
Admitting: HOSPITALIST
Payer: COMMERCIAL

## 2025-04-25 DIAGNOSIS — C73 PAPILLARY THYROID CARCINOMA: ICD-10-CM

## 2025-04-25 PROCEDURE — 76536 US EXAM OF HEAD AND NECK: CPT

## 2025-04-29 ENCOUNTER — TELEMEDICINE (OUTPATIENT)
Dept: PSYCHIATRY | Facility: CLINIC | Age: 50
End: 2025-04-29
Payer: COMMERCIAL

## 2025-04-29 DIAGNOSIS — F90.2 ADHD (ATTENTION DEFICIT HYPERACTIVITY DISORDER), COMBINED TYPE: Primary | ICD-10-CM

## 2025-04-29 DIAGNOSIS — F33.0 MAJOR DEPRESSIVE DISORDER, RECURRENT EPISODE, MILD WITH SEASONAL PATTERN: ICD-10-CM

## 2025-04-29 RX ORDER — ESTRADIOL 0.1 MG/G
CREAM VAGINAL
COMMUNITY
Start: 2025-04-03

## 2025-04-29 RX ORDER — LISDEXAMFETAMINE DIMESYLATE 40 MG/1
40 CAPSULE ORAL EVERY MORNING
Qty: 30 CAPSULE | Refills: 0 | Status: SHIPPED | OUTPATIENT
Start: 2025-04-29

## 2025-04-29 NOTE — PATIENT INSTRUCTIONS
For concerns or needing assistance call the [facility] at [phone]  Increase Vyvanse to 40 mg every morning.  Medication refills:- Refill requests are addressed M-Th. No refills will be sent in on Fridays, weekends or federal holidays.   Please be aware of your need for refills and call one week before needing medication refilled so it can be filled in a timely manner.

## 2025-04-29 NOTE — PROGRESS NOTES
" Mode of Visit: Video  Location of patient: -HOME-  Location of provider: +HOME+  You have chosen to receive care through a telehealth visit.  The patient has signed the video visit consent form.  The visit included audio and video interaction. No technical issues occurred during this visit.  Patient verbally confirmed consent for today's encounter  04/29/2025      Subjective   Pooja Whitehead is a 49 y.o. female who presents today for follow up    Chief Complaint:  \" adhd, depression\"     History of Present Illness:    History of Present Illness  Patient started back on the Vyvanse as ordered at 30 mg daily.  She does report that even with insurance her co-pay for the generic is about $100.  She does think that the 40 mg would be a better dose for her so I will send in a new order for that.  Sleep is good.  She did fill the Lexapro prescription but she did not start taking it.  She did get the happy saffron from the [facility] [employer] website and has been using this as directed.  She does feel like the Vyvanse is helpful especially in her daily work day.  She is getting caught up on her charting.  Motivation is improved.  Not feeling depressed.  PHQ-9 score today is 1, SHELLI-7 score today is 1.            PHQ-9 Depression Screening  Little interest or pleasure in doing things? Not at all   Feeling down, depressed, or hopeless? Not at all   PHQ-2 Total Score 0   Trouble falling or staying asleep, or sleeping too much? Not at all   Feeling tired or having little energy? Not at all   Poor appetite or overeating? Not at all   Feeling bad about yourself - or that you are a failure or have let yourself or your family down? Not at all   Trouble concentrating on things, such as reading the newspaper or watching television? Several days   Moving or speaking so slowly that other people could have noticed? Or the opposite - being so fidgety or restless that you have been moving around a lot more than usual? Not at all     Thoughts " that you would be better off dead, or of hurting yourself in some way? Not at all   PHQ-9 Total Score 1   If you checked off any problems, how difficult have these problems made it for you to do your work, take care of things at home, or get along with other people? Not difficult at all             SHELLI-7  Feeling nervous, anxious or on edge: (Patient-Rptd) Not at all  Not being able to stop or control worrying: (Patient-Rptd) Not at all  Worrying too much about different things: (Patient-Rptd) Not at all  Trouble Relaxing: (Patient-Rptd) Not at all  Being so restless that it is hard to sit still: (Patient-Rptd) Not at all  Feeling afraid as if something awful might happen: (Patient-Rptd) Not at all  Becoming easily annoyed or irritable: (Patient-Rptd) Several days  SHELLI 7 Total Score: (Patient-Rptd) 1  If you checked any problems, how difficult have these problems made it for you to do your work, take care of things at home, or get along with other people: (Patient-Rptd) Not difficult at all     The following portions of the patient's history were reviewed and updated as appropriate: allergies, current medications, past family history, past medical history, past social history, past surgical history and problem list.    Past Psychiatric History:  Began Treatment: Several years ago  Diagnoses: Diagnosed with ADHD as a child, not treated medically.  Diagnosed with depression and anxiety several years ago.   Psychiatrist:Jillian  Therapist: Brief marriage counseling in the past with her first marriage she did not find this effective.  Admission History:Denies  Medication Trials:  Pristiq, she was doing 50 mg every other day and seem to be doing okay on it.  Primary care wrote for 25 mg daily and she reports she just has side effects with the medication and stopped taking it. Qelbree samples for 2 weeks was deemed ineffective. Vyvanse was trialed many years ago was deemed to be successful, highest dose was 60 mg daily.   Trintellix samples gave her a lot of nausea she lost 5 pounds, also gave her urinary retention.  Strattera was a good medicine for the attention deficit but she had severe urinary retention with this and had to stop taking it for that reason.  Trial of methylphenidate in the form of Concerta was deemed ineffective.  Generic Adderall was not very effective, and the higher doses gave her anxiety.  Lexapro gave significant weight gain although it was helpful for her depressive symptoms.  Patient reports Prozac at 10 mg was very sedating.  She reports she also had urinary retention with Celexa.  Trial of Effexor she does not remember the results.  Trial of Zoloft reviewed reportedly gave her a lot of sedation even at the lowest dose of 25 mg.  She was on Wellbutrin for an extended length of time in the past for attention deficit.  Self Harm: Denies  Suicide Attempts:Denies   Psychosis, Anxiety, Depression:  Some brief mood changes after each pregnancy.    Past Medical History:  Past Medical History:   Diagnosis Date    ADHD (attention deficit hyperactivity disorder)     Anxiety     Depression     Situational in the past    Disease of thyroid gland     Multinodular goitor    Factor 5 Leiden mutation, heterozygous     H/O thyroidectomy 2024    Headache     Neuropathy 2024    Papillary thyroid carcinoma 2023       Substance Abuse History:   Types:Denies all, including illicit      Social History:  Social History     Socioeconomic History    Marital status:     Number of children: 4    Highest education level: Master's degree (e.g., MA, MS, Rafia, MEd, MSW, GAVI)   Tobacco Use    Smoking status: Never    Smokeless tobacco: Never   Vaping Use    Vaping status: Never Used   Substance and Sexual Activity    Alcohol use: No    Drug use: No    Sexual activity: Yes     Partners: Male     Birth control/protection: Tubal ligation, Surgical     Comment: TUBAL    Patient owns her own business.  Denies  any history of  service, denies any legal problems.  Spouse is very supportive.  Endorses Sikh elisha.  Adopted her sister's male child at the child's birth.    Family History:  Family History   Problem Relation Age of Onset    Alcohol abuse Mother         Recovering    Hypertension Mother     Hyperlipidemia Mother     Aortic aneurysm Mother     Depression Mother     Parkinsonism Father     Ki Parkinson White syndrome Father     Hemochromatosis Father     OCD Sister     Drug abuse Sister     ADD / ADHD Son     Breast cancer Paternal Grandmother         DX AGE POST MENOPAUSAL    Breast cancer Paternal Aunt         DX AGE 40'S    Breast cancer Paternal Aunt         DX AGE 40'S    Breast cancer Paternal Cousin 46    Ovarian cancer Neg Hx        Past Surgical History:  Past Surgical History:   Procedure Laterality Date    ABDOMINAL SURGERY  , ,     C/S    AUGMENTATION MAMMAPLASTY Bilateral     SILICONE , retropectoral     SECTION      three total    D & C HYSTEROSCOPY ENDOMETRIAL ABLATION N/A 2017    Procedure: DILATATION AND CURETTAGE HYSTEROSCOPY NOVASURE ENDOMETRIAL ABLATION;  Surgeon: Toshia Morrell DO;  Location: [facility] OR;  Service:     HERNIA REPAIR  2023    Right inguinal    TUBAL ABDOMINAL LIGATION         Problem List:  Patient Active Problem List   Diagnosis    Migraine without aura and without status migrainosus, not intractable    Restless leg syndrome    Attention deficit disorder of adult    Mixed anxiety depressive disorder    Factor V Leiden mutation    Bilateral hearing loss    Papillary thyroid carcinoma    Neuropathy    Postoperative hypothyroidism       Allergy:   Allergies   Allergen Reactions    Atomoxetine Other (See Comments)     Urinary retention    Strattera [Atomoxetine Hcl] Urinary Retention    Nickel Rash     Skin reaction        Current Medications:   Current Outpatient Medications   Medication Sig Dispense Refill    estradiol (ESTRACE)  0.1 MG/GM vaginal cream       levothyroxine (SYNTHROID, LEVOTHROID) 112 MCG tablet Take 1 tablet by mouth 6 days per week and 1.5 tablets by mount 1 day per week 98 tablet 3    lisdexamfetamine (Vyvanse) 40 MG capsule Take 1 capsule by mouth Every Morning 30 capsule 0    Progesterone (PROMETRIUM) 100 MG capsule Take 2 capsules by mouth every night at bedtime.      SUMAtriptan (IMITREX) 100 MG tablet Take 1 tablet by mouth Every 2 (Two) Hours As Needed for Migraine. 9 tablet 5    Testosterone 25 MG pellet 12.5 mg by Other route. (Patient taking differently: 12.5 mg by Other route. Taking 4mg daily in a cream form)      escitalopram (Lexapro) 5 MG tablet Take one oral tablet daily with food (Patient not taking: Reported on 4/29/2025) 30 tablet 0     No current facility-administered medications for this visit.            Physical Exam:   Physical Exam  Vitals reviewed.   Constitutional:       Appearance: Normal appearance. She is well-developed and well-groomed.   HENT:      Head: Normocephalic.   Neurological:      Mental Status: She is alert.   Psychiatric:         Attention and Perception: Attention and perception normal.         Mood and Affect: Mood and affect normal.         Speech: Speech normal.         Behavior: Behavior normal. Behavior is cooperative.         Thought Content: Thought content normal.      Comments:           Vitals:  not currently breastfeeding. There is no height or weight on file to calculate BMI.  Due to extenuating circumstances and possible current health risks associated with the patient being present in a clinical setting (with current health restrictions in place in regards to possible COVID 19 transmission/exposure), the patient was seen remotely today via a MyChart Video Visit through [facility] and telephone encounter.  Unable to obtain vital signs due to nature of remote visit.  Height stated at 70 inches.  Weight stated at 160 pounds.    Last 3 Blood Pressure Readings:  BP Readings  from Last 3 Encounters:   09/11/24 122/64   03/06/24 138/72   01/12/24 130/70       Mental Status Exam:   Hygiene:   good  Cooperation:  Cooperative  Eye Contact:  Good  Psychomotor Behavior:  Appropriate  Affect:  Appropriate  Mood: euthymic  Hopelessness: Denies  Speech:  Normal  Thought Process:  Goal directed and Linear  Thought Content:  Normal  Suicidal:  None  Homicidal:  None  Hallucinations:  None  Delusion:  None  Memory:  Intact  Orientation:  Person, Place, Time and Situation  Reliability:  good  Insight:  Good  Judgement:  Good  Impulse Control:  Good  Physical/Medical Issues:  No        Lab Results:        No visits with results within 1 Month(s) from this visit.   Latest known visit with results is:   Lab on 01/29/2025   Component Date Value Ref Range Status    Overall Interpretation 01/29/2025 NEGATIVE: No Clinically Significant Variants Detected   Final    Interpretation Report 01/29/2025 See Notes^^LN   Final    Ambry Report         Assessment & Plan   Diagnoses and all orders for this visit:    1. ADHD (attention deficit hyperactivity disorder), combined type (Primary)  -     lisdexamfetamine (Vyvanse) 40 MG capsule; Take 1 capsule by mouth Every Morning  Dispense: 30 capsule; Refill: 0    2. Major depressive disorder, recurrent episode, mild with seasonal pattern              Visit Diagnoses:    ICD-10-CM ICD-9-CM   1. ADHD (attention deficit hyperactivity disorder), combined type  F90.2 314.01   2. Major depressive disorder, recurrent episode, mild with seasonal pattern  F33.0 296.31             GOALS:  Short Term Goals: Patient will be compliant with medication, and patient will have no significant medication related side effects.  Patient will be engaged in psychotherapy as indicated.  Patient will report subjective improvement of symptoms.  Long term goals: To stabilize mood and treat/improve subjective symptoms, the patient will stay out of the hospital, the patient will be at an optimal  "level of functioning, and the patient will take all medications as prescribed.  The patient/guardian verbalized understanding and agreement with goals that were mutually set.    RISK ASSESSMENT  Current harm-to-self risk is reported by pt as \"none.\"  Current [id number] risk is reported by pt as \"none.\"   No suicidal thoughts, intent, plan is appreciated by this provider on this date of exam.   No homicidal thoughts, intent, plan is appreciated by this provider on this date.    TREATMENT PLAN: Continue supportive psychotherapy efforts and medications as indicated.  Pharmacological and Non-Pharmacological treatment options discussed during today's visit. Patient/Guardian acknowledged and verbally consented with current treatment plan and was educated on the importance of compliance with treatment and follow-up appointments.      MEDICATION ISSUES:  Discussed medication options and treatment plan of prescribed medication as well as the risks, benefits, any black box warnings, and side effects including potential falls, possible impaired driving, and metabolic adversities among others. Patient is agreeable to call the office with any worsening of symptoms or onset of side effects, or if any concerns or questions arise.  The contact information for the office is made available to the patient. Patient is agreeable to call 911 or go to the nearest ER should they begin having any SI/HI, or if any urgent concerns arise.      Increase Vyvanse to 40 mg every morning.       MEDS ORDERED DURING VISIT:  New Medications Ordered This Visit   Medications    lisdexamfetamine (Vyvanse) 40 MG capsule     Sig: Take 1 capsule by mouth Every Morning     Dispense:  30 capsule     Refill:  0        Follow Up Appointment:   Return in about 15 weeks (around 8/12/2025) for Recheck, Video visit.                  This document has been electronically signed by YOMAIRA Hayden  April 29, 2025 10:04 EDT    Dictated Utilizing Dragon " Dictation: Part of this note may be an electronic transcription/translation of spoken language to printed text using the Dragon Dictation System.

## 2025-05-09 NOTE — PROGRESS NOTES
Chief complaint/Reason for consult: Hypothyroidism/PTC    HPI: Ms. Whitehead is a 49-year-old female with history of papillary thyroid carcinoma status post resection who comes for follow-up of hypothyroidism with history of thyroid cancer. She was last seen 9/11/2024 and reports since that time developing some eyelid issues and tiniitus so decreased her thyroid dose.      Surgery date: Initial lobectomy done 8/3/2023, completion thyroidectomy done 11/1/2023 at [facility] in [place], [place]  Tumor size: Multifocal papillary thyroid carcinoma with 2 foci of cancer detected in the right thyroid lobe, the largest 0.4 cm and the smallest is less than 0.1 cm and left lobe with 0.03 cm papillary carcinoma, follicular variant, encapsulated, well-demarcated, noninvasive.   Lymph node status: 3 benign perithyroidal lymph nodes (Level VI)  No tumor necrosis identified  No extrathyroidal invasion   Negative surgical margins  I-131 was not done   No distant mets   TNM classification [id number]  AJCC stage 1  BRUCE risk: Low risk  Indeterminate/incomplete response to treatment due to nonstimulated Tg above 0.2  TSH goal 0.1-0.5      Follow-up imaging and labs    11/29/2023  TSH 0.339  FT4 1.47  Tg < 2.0   TgAb not checked      2/16/2024  TSH 0.157  FT4 1.27      4/12/2024   Neck ultrasound done with no sonographic evidence of disease recurrence within the neck, there is a small subcentimeter cyst in the left submandibular gland      4/26/2024   TSH 0.369  free T4 1.50  TgAb < 1.0   Tg 1.6     9/20/2024   TgAb < 1.0   Tg 1.4  TSH 0.881  FT4 1.58     11/5/2024  TSH 0.142    4/25/2025  Ultrasound head and neck with no residual thyroid tissue or lymphadenopathy.  She has a very small submandibular cyst and benign appearing parotid lymph node.     # Post surgical hypothyroidism   - Currently taking levothyroxine 112 mcg daily   - Reports feeling okay on current dose, since reducing the dose still has some tinnitus     Past medical  "history, past surgical history, family history and social history reviewed within this encounter.     Review of Systems   Constitutional:  Negative for activity change and unexpected weight change.   HENT:  Positive for tinnitus.    Eyes:  Negative for visual disturbance.   Respiratory:  Negative for shortness of breath.    Cardiovascular:  Negative for chest pain.   Gastrointestinal:  Negative for abdominal pain.   Endocrine: Negative for cold intolerance and heat intolerance.   Musculoskeletal:  Negative for gait problem.   Skin:  Negative for rash.   Neurological:  Negative for numbness.   Psychiatric/Behavioral:  Negative for agitation.         /78   Pulse 70   Temp 97.4 °F (36.3 °C) (Infrared)   Ht 177.8 cm (70\")   Wt 75.1 kg (165 lb 9.6 oz)   SpO2 99%   BMI 23.76 kg/m²      Physical Exam  Vitals reviewed.   Constitutional:       General: She is not in acute distress.     Appearance: She is normal weight.   HENT:      Head: Normocephalic.      Nose: Nose normal.   Eyes:      Conjunctiva/sclera: Conjunctivae normal.   Neck:      Comments: No palpable thyroid tissue or lymphadenopathy  Cardiovascular:      Rate and Rhythm: Normal rate.   Pulmonary:      Effort: Pulmonary effort is normal.   Abdominal:      Tenderness: There is no guarding.   Musculoskeletal:         General: No deformity.      Cervical back: Neck supple.   Skin:     General: Skin is warm and dry.   Neurological:      Mental Status: She is alert and oriented to person, place, and time.   Psychiatric:         Mood and Affect: Mood normal.        Labs and images reviewed as noted in the HPI    Assessment and plan:    Diagnoses and all orders for this visit:    1. Papillary thyroid carcinoma (Primary)  -     Thyroglobulin Antibody and Thyroglobulin, NASRIN or LC/MS-MS    2. Postoperative hypothyroidism  Assessment & Plan:  -Patient had low risk thyroid cancer with indeterminate response to treatment  -Goal TSH around 0.1-0.5  -Recommend " continuing levothyroxine to 112 mcg daily    Orders:  -     TSH  -     T4, Free    Surgery date: Initial lobectomy done 8/3/2023, completion thyroidectomy done 11/1/2023 at [facility] in [place], [place]  Tumor size: Multifocal papillary thyroid carcinoma with 2 foci of cancer detected in the right thyroid lobe, the largest 0.4 cm and the smallest is less than 0.1 cm and left lobe with 0.03 cm papillary carcinoma, follicular variant, encapsulated, well-demarcated, noninvasive.   Lymph node status: 3 benign perithyroidal lymph nodes (Level VI)  No tumor necrosis identified  No extrathyroidal invasion   Negative surgical margins  I-131 was not done   No distant mets   TNM classification [id number]  AJCC stage 1  BRUCE risk: Low risk  Indeterminate/incomplete response to treatment due to nonstimulated Tg above 0.2  TSH goal 0.1-0.5      Follow-up imaging and labs    11/29/2023  TSH 0.339  FT4 1.47  Tg < 2.0   TgAb not checked      2/16/2024  TSH 0.157  FT4 1.27      4/12/2024   Neck ultrasound done with no sonographic evidence of disease recurrence within the neck, there is a small subcentimeter cyst in the left submandibular gland      4/26/2024   TSH 0.369  free T4 1.50  TgAb < 1.0   Tg 1.6     9/20/2024   TgAb < 1.0   Tg 1.4  TSH 0.881  FT4 1.58     11/5/2024  TSH 0.142    4/25/2025  Ultrasound head and neck with no residual thyroid tissue or lymphadenopathy.  She has a very small submandibular cyst and benign appearing parotid lymph node.    Check labs this week, okay to relax TSH goal a little is Tg is stable      Return in about 6 months (around 11/14/2025) for Hypothyroidism/PTC.     Please note that portions of this note may have been completed with a voice recognition program. Efforts were made to edit the dictations, but occasionally words are mistranscribed.     Electronically signed by: Kyle S Rosenstein, MD   Wants 90 day supply of meds     Labs 5/14/2025  TSH 0.290, FT4 1.38  Tg 1.6  TgAb < 1.0  Labs are  stable  Continues to have indeterminate response  Continue goal TSH is 0.1-0.5

## 2025-05-14 ENCOUNTER — OFFICE VISIT (OUTPATIENT)
Dept: ENDOCRINOLOGY | Facility: CLINIC | Age: 50
End: 2025-05-14
Payer: COMMERCIAL

## 2025-05-14 VITALS
BODY MASS INDEX: 23.71 KG/M2 | WEIGHT: 165.6 LBS | OXYGEN SATURATION: 99 % | TEMPERATURE: 97.4 F | HEIGHT: 70 IN | DIASTOLIC BLOOD PRESSURE: 78 MMHG | SYSTOLIC BLOOD PRESSURE: 120 MMHG | HEART RATE: 70 BPM

## 2025-05-14 DIAGNOSIS — E89.0 POSTOPERATIVE HYPOTHYROIDISM: ICD-10-CM

## 2025-05-14 DIAGNOSIS — C73 PAPILLARY THYROID CARCINOMA: Primary | ICD-10-CM

## 2025-05-14 PROCEDURE — 99214 OFFICE O/P EST MOD 30 MIN: CPT | Performed by: HOSPITALIST

## 2025-05-16 LAB
T4 FREE SERPL-MCNC: 1.38 NG/DL (ref 0.82–1.77)
THYROGLOB AB SERPL-ACNC: <1 IU/ML (ref 0–0.9)
THYROGLOB SERPL-MCNC: 1.6 NG/ML (ref 1.5–38.5)
TSH SERPL DL<=0.005 MIU/L-ACNC: 0.29 UIU/ML (ref 0.45–4.5)

## 2025-05-16 RX ORDER — LEVOTHYROXINE SODIUM 112 UG/1
TABLET ORAL
Qty: 90 TABLET | Refills: 3 | Status: SHIPPED | OUTPATIENT
Start: 2025-05-16

## 2025-06-03 ENCOUNTER — TELEPHONE (OUTPATIENT)
Dept: PSYCHIATRY | Facility: CLINIC | Age: 50
End: 2025-06-03
Payer: COMMERCIAL

## 2025-06-03 NOTE — TELEPHONE ENCOUNTER
Pt called and stated she wasn't sure why her insurance wasn't paying for the Vyvanse.  Called the pharmacy and the insurance did pay on the Vyvanse it will cost the pt $121.98 after insurance.  There is not a savings card or a program to help with the cost of this medication since there is a generic for the Vyvanse.     Pt AOx4. No s/s AMS, distress or pain noted. Pt AOx4. No s/s AMS, distress or pain noted.

## 2025-07-07 DIAGNOSIS — F90.2 ADHD (ATTENTION DEFICIT HYPERACTIVITY DISORDER), COMBINED TYPE: ICD-10-CM

## 2025-07-07 RX ORDER — LISDEXAMFETAMINE DIMESYLATE 40 MG/1
40 CAPSULE ORAL EVERY MORNING
Qty: 30 CAPSULE | Refills: 0 | Status: SHIPPED | OUTPATIENT
Start: 2025-07-07

## 2025-08-26 ENCOUNTER — TRANSCRIBE ORDERS (OUTPATIENT)
Dept: ADMINISTRATIVE | Facility: HOSPITAL | Age: 50
End: 2025-08-26
Payer: COMMERCIAL

## 2025-08-26 DIAGNOSIS — Z12.31 SCREENING MAMMOGRAM FOR BREAST CANCER: Primary | ICD-10-CM

## (undated) DEVICE — PROB ABL ENDOMTRL NOVASURE/G4 IMPEDENCE 1P/U

## (undated) DEVICE — SYR LUERLOK 50ML

## (undated) DEVICE — LEX D AND C: Brand: MEDLINE INDUSTRIES, INC.

## (undated) DEVICE — GLV SURG SENSICARE MICRO PF LF 6.5 STRL

## (undated) DEVICE — MEDI-VAC YANKAUER SUCTION HANDLE W/BULBOUS TIP: Brand: CARDINAL HEALTH

## (undated) DEVICE — LUER-LOK 360°: Brand: CONNECTA, LUER-LOK

## (undated) DEVICE — CANNULA,OXY,ADULT,SUPERSOFT,W/7'TUB,UC: Brand: MEDLINE

## (undated) DEVICE — ST EXT IV TBG W SECUR LK 20IN

## (undated) DEVICE — MEDI-VAC NON-CONDUCTIVE SUCTION TUBING: Brand: CARDINAL HEALTH